# Patient Record
Sex: FEMALE | Race: WHITE | Employment: OTHER | URBAN - METROPOLITAN AREA
[De-identification: names, ages, dates, MRNs, and addresses within clinical notes are randomized per-mention and may not be internally consistent; named-entity substitution may affect disease eponyms.]

---

## 2017-03-11 PROBLEM — R60.0 EDEMA OF LEFT LOWER EXTREMITY: Status: ACTIVE | Noted: 2017-03-11

## 2017-03-25 ENCOUNTER — HOSPITAL ENCOUNTER (OUTPATIENT)
Dept: OTHER | Age: 82
Discharge: HOME | End: 2017-03-25
Attending: FAMILY MEDICINE | Admitting: FAMILY MEDICINE

## 2017-03-25 DIAGNOSIS — R52 PAIN: ICD-10-CM

## 2017-10-13 ENCOUNTER — OFFICE VISIT (OUTPATIENT)
Dept: FAMILY MEDICINE CLINIC | Age: 82
End: 2017-10-13

## 2017-10-13 VITALS
DIASTOLIC BLOOD PRESSURE: 80 MMHG | OXYGEN SATURATION: 99 % | WEIGHT: 103 LBS | BODY MASS INDEX: 20.8 KG/M2 | HEART RATE: 68 BPM | SYSTOLIC BLOOD PRESSURE: 140 MMHG

## 2017-10-13 DIAGNOSIS — L70.0 BLACK HEAD: Primary | ICD-10-CM

## 2017-10-13 PROCEDURE — 1123F ACP DISCUSS/DSCN MKR DOCD: CPT | Performed by: FAMILY MEDICINE

## 2017-10-13 PROCEDURE — 1036F TOBACCO NON-USER: CPT | Performed by: FAMILY MEDICINE

## 2017-10-13 PROCEDURE — 99213 OFFICE O/P EST LOW 20 MIN: CPT | Performed by: FAMILY MEDICINE

## 2017-10-13 PROCEDURE — G8427 DOCREV CUR MEDS BY ELIG CLIN: HCPCS | Performed by: FAMILY MEDICINE

## 2017-10-13 PROCEDURE — 4040F PNEUMOC VAC/ADMIN/RCVD: CPT | Performed by: FAMILY MEDICINE

## 2017-10-13 PROCEDURE — 1090F PRES/ABSN URINE INCON ASSESS: CPT | Performed by: FAMILY MEDICINE

## 2017-10-13 PROCEDURE — G8484 FLU IMMUNIZE NO ADMIN: HCPCS | Performed by: FAMILY MEDICINE

## 2017-10-13 PROCEDURE — G8420 CALC BMI NORM PARAMETERS: HCPCS | Performed by: FAMILY MEDICINE

## 2017-10-13 ASSESSMENT — ENCOUNTER SYMPTOMS
EYE DISCHARGE: 0
EYE REDNESS: 0
EYE PAIN: 0
EYE ITCHING: 0

## 2017-10-13 NOTE — PROGRESS NOTES
Layla Fowler  : 1928  Encounter date: 10/13/2017    This is a 80 y.o. female who presents with  Chief Complaint   Patient presents with    Eyelid Problem     Has a bump on right eye lid x 1 week. Not painful. History of present illness:    Not painful, not sure that it has changed in size. Just noticed since last visit      Allergies   Allergen Reactions    Actonel [Risedronate Sodium] Other (See Comments)     Gastrointestinal upset.  Alendronate Sodium Other (See Comments)     Gastrointestinal upset    Altace [Ramipril]     Boniva [Ibandronic Acid]     Centrax [Prazepam]     Climara [Estradiol]     Doxycycline Calcium [Doxycycline Calcium]     Estrogenic Substance     Metoprolol Other (See Comments)     Severe se from generic.  Mobic [Meloxicam] Other (See Comments)     Fluid.  Pcn [Penicillins]     Sulfa Antibiotics     Triamterene-Hctz     Vioxx [Rofecoxib] Other (See Comments)     Dermatitis.  Dyazide [Hydrochlorothiazide W-Triamterene] Rash     Outpatient Prescriptions Marked as Taking for the 10/13/17 encounter (Office Visit) with Tk Millan MD   Medication Sig Dispense Refill    allopurinol (ZYLOPRIM) 100 MG tablet TAKE ONE TABLET BY MOUTH TWICE DAILY 180 tablet 3    TOPROL XL 25 MG extended release tablet TAKE 1 TABLET DAILY 90 tablet 3    DIOVAN -25 MG per tablet TAKE 1 TABLET DAILY 90 tablet 3    omeprazole (PRILOSEC) 20 MG delayed release capsule Take 1 capsule by mouth Daily 90 capsule 3    cloNIDine (CATAPRES) 0.2 MG tablet Take 1 tablet by mouth 2 times daily 180 tablet 3    Dermatological Products, Misc. [facility]) EMUL Apply externally once daily 1 Tube 4    sucralfate (CARAFATE) 1 GM tablet Take 1 tablet by mouth 4 times daily. 120 tablet 1       Review of Systems   Eyes: Negative for pain, discharge, redness and itching.        Objective:    BP (!) 140/80   Pulse 68   Wt 103 lb (46.7 kg)   SpO2 99%   BMI 20.80 kg/m²   Weight:

## 2017-11-03 ENCOUNTER — NURSE ONLY (OUTPATIENT)
Dept: FAMILY MEDICINE CLINIC | Age: 82
End: 2017-11-03

## 2017-11-03 VITALS — HEART RATE: 76 BPM | DIASTOLIC BLOOD PRESSURE: 62 MMHG | SYSTOLIC BLOOD PRESSURE: 119 MMHG

## 2018-01-05 ENCOUNTER — TELEPHONE (OUTPATIENT)
Dept: FAMILY MEDICINE CLINIC | Age: 83
End: 2018-01-05

## 2018-01-05 RX ORDER — EMOLLIENT COMBINATION NO.32
EMULSION, EXTENDED RELEASE TOPICAL
Qty: 1 TUBE | Refills: 4 | Status: SHIPPED | OUTPATIENT
Start: 2018-01-05

## 2018-01-05 NOTE — TELEPHONE ENCOUNTER
Dermatological Products, [facility]) EMUL  Dispense Quantity:  1 Tube  Print as this is to be sent to [facility]) /

## 2018-01-18 ENCOUNTER — TELEPHONE (OUTPATIENT)
Dept: FAMILY MEDICINE CLINIC | Age: 83
End: 2018-01-18

## 2018-01-24 ENCOUNTER — TELEPHONE (OUTPATIENT)
Dept: FAMILY MEDICINE CLINIC | Age: 83
End: 2018-01-24

## 2018-02-20 ENCOUNTER — HOSPITAL ENCOUNTER (OUTPATIENT)
Dept: OTHER | Age: 83
Discharge: HOME | End: 2018-02-20
Attending: FAMILY MEDICINE | Admitting: FAMILY MEDICINE

## 2018-02-20 ENCOUNTER — OFFICE VISIT (OUTPATIENT)
Dept: FAMILY MEDICINE CLINIC | Age: 83
End: 2018-02-20

## 2018-02-20 VITALS
OXYGEN SATURATION: 98 % | BODY MASS INDEX: 20.32 KG/M2 | SYSTOLIC BLOOD PRESSURE: 172 MMHG | DIASTOLIC BLOOD PRESSURE: 80 MMHG | WEIGHT: 100.6 LBS | HEART RATE: 72 BPM

## 2018-02-20 DIAGNOSIS — M54.5 LOW BACK PAIN, UNSPECIFIED BACK PAIN LATERALITY, UNSPECIFIED CHRONICITY, WITH SCIATICA PRESENCE UNSPECIFIED: ICD-10-CM

## 2018-02-20 DIAGNOSIS — M25.551 RIGHT HIP PAIN: ICD-10-CM

## 2018-02-20 DIAGNOSIS — I10 ESSENTIAL HYPERTENSION: ICD-10-CM

## 2018-02-20 DIAGNOSIS — M53.3 SACROILIAC JOINT DYSFUNCTION OF RIGHT SIDE: ICD-10-CM

## 2018-02-20 DIAGNOSIS — M25.551 PAIN OF RIGHT HIP JOINT: Primary | ICD-10-CM

## 2018-02-20 PROCEDURE — 4040F PNEUMOC VAC/ADMIN/RCVD: CPT | Performed by: FAMILY MEDICINE

## 2018-02-20 PROCEDURE — 99214 OFFICE O/P EST MOD 30 MIN: CPT | Performed by: FAMILY MEDICINE

## 2018-02-20 PROCEDURE — G8427 DOCREV CUR MEDS BY ELIG CLIN: HCPCS | Performed by: FAMILY MEDICINE

## 2018-02-20 PROCEDURE — 1090F PRES/ABSN URINE INCON ASSESS: CPT | Performed by: FAMILY MEDICINE

## 2018-02-20 PROCEDURE — 1036F TOBACCO NON-USER: CPT | Performed by: FAMILY MEDICINE

## 2018-02-20 PROCEDURE — 1123F ACP DISCUSS/DSCN MKR DOCD: CPT | Performed by: FAMILY MEDICINE

## 2018-02-20 PROCEDURE — G8420 CALC BMI NORM PARAMETERS: HCPCS | Performed by: FAMILY MEDICINE

## 2018-02-20 PROCEDURE — G8484 FLU IMMUNIZE NO ADMIN: HCPCS | Performed by: FAMILY MEDICINE

## 2018-02-20 NOTE — PATIENT INSTRUCTIONS
more pain medicines at the same time unless the doctor told you to. Many pain medicines have acetaminophen, which is Tylenol. Too much acetaminophen (Tylenol) can be harmful. · To prevent future back pain, do exercises to stretch and strengthen your back and stomach. Learn how to use good posture, safe lifting techniques, and proper body mechanics. When should you call for help? Call 911 anytime you think you may need emergency care. For example, call if:  ? · You are unable to move a leg at all. ?Call your doctor now or seek immediate medical care if:  ? · You have new or worse symptoms in your legs or buttocks. Symptoms may include:  ¨ Numbness or tingling. ¨ Weakness. ¨ Pain. ? · You lose bladder or bowel control. ? Watch closely for changes in your health, and be sure to contact your doctor if:  ? · You are not getting better as expected. Where can you learn more? Go to [email or web address]://[facility].[employer]. org and sign in to your [employer] account. Enter W719 in the [employer] box to learn more about \"Sacroiliac Joint Pain: Care Instructions. \"     If you do not have an account, please click on the \"Sign Up Now\" link. Current as of: March 21, 2017  Content Version: 11.5  © 4225-0053 [employer], Inc[employer]. Care instructions adapted under license by [facility] ([facility]). If you have questions about a medical condition or this instruction, always ask your healthcare professional. [place] [address] any warranty or liability for your use of this information.

## 2018-03-26 ENCOUNTER — TELEPHONE (OUTPATIENT)
Dept: FAMILY MEDICINE CLINIC | Age: 83
End: 2018-03-26

## 2018-04-17 ENCOUNTER — OFFICE VISIT (OUTPATIENT)
Dept: FAMILY MEDICINE CLINIC | Age: 83
End: 2018-04-17

## 2018-04-17 VITALS
DIASTOLIC BLOOD PRESSURE: 76 MMHG | HEART RATE: 58 BPM | BODY MASS INDEX: 20.32 KG/M2 | SYSTOLIC BLOOD PRESSURE: 138 MMHG | OXYGEN SATURATION: 98 % | WEIGHT: 100.6 LBS

## 2018-04-17 DIAGNOSIS — M81.0 AGE-RELATED OSTEOPOROSIS WITHOUT CURRENT PATHOLOGICAL FRACTURE: ICD-10-CM

## 2018-04-17 DIAGNOSIS — M10.9 GOUT OF WRIST, UNSPECIFIED CAUSE, UNSPECIFIED CHRONICITY, UNSPECIFIED LATERALITY: ICD-10-CM

## 2018-04-17 DIAGNOSIS — I10 ESSENTIAL HYPERTENSION: Primary | ICD-10-CM

## 2018-04-17 DIAGNOSIS — R60.0 EDEMA OF LEFT LOWER EXTREMITY: ICD-10-CM

## 2018-04-17 DIAGNOSIS — M19.90 OSTEOARTHRITIS, UNSPECIFIED OSTEOARTHRITIS TYPE, UNSPECIFIED SITE: ICD-10-CM

## 2018-04-17 DIAGNOSIS — R41.3 MEMORY LOSS: ICD-10-CM

## 2018-04-17 PROBLEM — M85.80 OSTEOPENIA: Status: RESOLVED | Noted: 2018-04-17 | Resolved: 2018-04-17

## 2018-04-17 PROBLEM — M85.80 OSTEOPENIA: Status: ACTIVE | Noted: 2018-04-17

## 2018-04-17 PROCEDURE — 99214 OFFICE O/P EST MOD 30 MIN: CPT | Performed by: FAMILY MEDICINE

## 2018-04-17 PROCEDURE — G8420 CALC BMI NORM PARAMETERS: HCPCS | Performed by: FAMILY MEDICINE

## 2018-04-17 PROCEDURE — 1036F TOBACCO NON-USER: CPT | Performed by: FAMILY MEDICINE

## 2018-04-17 PROCEDURE — 1090F PRES/ABSN URINE INCON ASSESS: CPT | Performed by: FAMILY MEDICINE

## 2018-04-17 PROCEDURE — 1123F ACP DISCUSS/DSCN MKR DOCD: CPT | Performed by: FAMILY MEDICINE

## 2018-04-17 PROCEDURE — 4040F PNEUMOC VAC/ADMIN/RCVD: CPT | Performed by: FAMILY MEDICINE

## 2018-04-17 PROCEDURE — G8427 DOCREV CUR MEDS BY ELIG CLIN: HCPCS | Performed by: FAMILY MEDICINE

## 2018-04-17 ASSESSMENT — PATIENT HEALTH QUESTIONNAIRE - PHQ9
1. LITTLE INTEREST OR PLEASURE IN DOING THINGS: 0
SUM OF ALL RESPONSES TO PHQ9 QUESTIONS 1 & 2: 0
2. FEELING DOWN, DEPRESSED OR HOPELESS: 0
SUM OF ALL RESPONSES TO PHQ QUESTIONS 1-9: 0

## 2018-09-18 ENCOUNTER — TELEPHONE (OUTPATIENT)
Dept: FAMILY MEDICINE CLINIC | Age: 83
End: 2018-09-18

## 2018-09-18 DIAGNOSIS — R41.3 MEMORY LOSS: ICD-10-CM

## 2018-09-18 DIAGNOSIS — I10 ESSENTIAL HYPERTENSION: Primary | ICD-10-CM

## 2018-09-18 DIAGNOSIS — R41.0 CONFUSION: ICD-10-CM

## 2018-09-19 NOTE — TELEPHONE ENCOUNTER
Advised Raisa of information and lab orders put in Epic for her to go to [id number] [facility].

## 2018-09-19 NOTE — TELEPHONE ENCOUNTER
First I would recommend that she have laboratory profiling makers nothing wrong including a CBC, CMP and urinalysis.   Secondly they can use melatonin 5 mg daily at bedtime

## 2018-10-01 ENCOUNTER — HOSPITAL ENCOUNTER (OUTPATIENT)
Age: 83
Discharge: HOME | End: 2018-10-01
Payer: MEDICARE

## 2018-10-01 DIAGNOSIS — R41.0 CONFUSION: ICD-10-CM

## 2018-10-01 DIAGNOSIS — I10 ESSENTIAL HYPERTENSION: ICD-10-CM

## 2018-10-01 DIAGNOSIS — R41.3 MEMORY LOSS: ICD-10-CM

## 2018-10-01 LAB
A/G RATIO: 1.3 (ref 1.1–2.2)
ALBUMIN SERPL-MCNC: 4.4 G/DL (ref 3.4–5)
ALP BLD-CCNC: 58 U/L (ref 40–129)
ALT SERPL-CCNC: 12 U/L (ref 10–40)
ANION GAP SERPL CALCULATED.3IONS-SCNC: 13 MMOL/L (ref 3–16)
AST SERPL-CCNC: 22 U/L (ref 15–37)
BILIRUB SERPL-MCNC: 0.4 MG/DL (ref 0–1)
BILIRUBIN URINE: NEGATIVE
BLOOD, URINE: NEGATIVE
BUN BLDV-MCNC: 40 MG/DL (ref 7–20)
CALCIUM SERPL-MCNC: 9.8 MG/DL (ref 8.3–10.6)
CHLORIDE BLD-SCNC: 100 MMOL/L (ref 99–110)
CLARITY: CLEAR
CO2: 27 MMOL/L (ref 21–32)
COLOR: YELLOW
CREAT SERPL-MCNC: 1.5 MG/DL (ref 0.6–1.2)
GFR AFRICAN AMERICAN: 39
GFR NON-AFRICAN AMERICAN: 33
GLOBULIN: 3.5 G/DL
GLUCOSE BLD-MCNC: 110 MG/DL (ref 70–99)
GLUCOSE URINE: NEGATIVE MG/DL
HCT VFR BLD CALC: 35.4 % (ref 36–48)
HEMOGLOBIN: 11.6 G/DL (ref 12–16)
KETONES, URINE: NEGATIVE MG/DL
LEUKOCYTE ESTERASE, URINE: NEGATIVE
MCH RBC QN AUTO: 28.7 PG (ref 26–34)
MCHC RBC AUTO-ENTMCNC: 32.8 G/DL (ref 31–36)
MCV RBC AUTO: 87.4 FL (ref 80–100)
MICROSCOPIC EXAMINATION: NORMAL
NITRITE, URINE: NEGATIVE
PDW BLD-RTO: 14.8 % (ref 12.4–15.4)
PH UA: 6
PLATELET # BLD: 180 K/UL (ref 135–450)
PMV BLD AUTO: 9.9 FL (ref 5–10.5)
POTASSIUM SERPL-SCNC: 4.1 MMOL/L (ref 3.5–5.1)
PROTEIN UA: NEGATIVE MG/DL
RBC # BLD: 4.05 M/UL (ref 4–5.2)
SODIUM BLD-SCNC: 140 MMOL/L (ref 136–145)
SPECIFIC GRAVITY UA: 1.02
TOTAL PROTEIN: 7.9 G/DL (ref 6.4–8.2)
URINE TYPE: NORMAL
UROBILINOGEN, URINE: 0.2 E.U./DL
WBC # BLD: 7.2 K/UL (ref 4–11)

## 2018-10-01 PROCEDURE — 80053 COMPREHEN METABOLIC PANEL: CPT

## 2018-10-01 PROCEDURE — 36415 COLL VENOUS BLD VENIPUNCTURE: CPT

## 2018-10-01 PROCEDURE — 81003 URINALYSIS AUTO W/O SCOPE: CPT

## 2018-10-01 PROCEDURE — 85027 COMPLETE CBC AUTOMATED: CPT

## 2018-10-02 ENCOUNTER — OFFICE VISIT (OUTPATIENT)
Dept: FAMILY MEDICINE CLINIC | Age: 83
End: 2018-10-02
Payer: MEDICARE

## 2018-10-02 VITALS
DIASTOLIC BLOOD PRESSURE: 62 MMHG | OXYGEN SATURATION: 97 % | BODY MASS INDEX: 19.82 KG/M2 | SYSTOLIC BLOOD PRESSURE: 140 MMHG | WEIGHT: 98.13 LBS | RESPIRATION RATE: 12 BRPM | HEART RATE: 63 BPM

## 2018-10-02 DIAGNOSIS — Z23 NEED FOR INFLUENZA VACCINATION: ICD-10-CM

## 2018-10-02 DIAGNOSIS — R41.3 MEMORY LOSS: ICD-10-CM

## 2018-10-02 DIAGNOSIS — M19.90 OSTEOARTHRITIS, UNSPECIFIED OSTEOARTHRITIS TYPE, UNSPECIFIED SITE: ICD-10-CM

## 2018-10-02 DIAGNOSIS — M10.9 GOUT OF WRIST, UNSPECIFIED CAUSE, UNSPECIFIED CHRONICITY, UNSPECIFIED LATERALITY: ICD-10-CM

## 2018-10-02 DIAGNOSIS — I10 ESSENTIAL HYPERTENSION: Primary | ICD-10-CM

## 2018-10-02 PROCEDURE — 1036F TOBACCO NON-USER: CPT | Performed by: FAMILY MEDICINE

## 2018-10-02 PROCEDURE — G8427 DOCREV CUR MEDS BY ELIG CLIN: HCPCS | Performed by: FAMILY MEDICINE

## 2018-10-02 PROCEDURE — 1101F PT FALLS ASSESS-DOCD LE1/YR: CPT | Performed by: FAMILY MEDICINE

## 2018-10-02 PROCEDURE — 4040F PNEUMOC VAC/ADMIN/RCVD: CPT | Performed by: FAMILY MEDICINE

## 2018-10-02 PROCEDURE — 90662 IIV NO PRSV INCREASED AG IM: CPT | Performed by: FAMILY MEDICINE

## 2018-10-02 PROCEDURE — G8420 CALC BMI NORM PARAMETERS: HCPCS | Performed by: FAMILY MEDICINE

## 2018-10-02 PROCEDURE — G8482 FLU IMMUNIZE ORDER/ADMIN: HCPCS | Performed by: FAMILY MEDICINE

## 2018-10-02 PROCEDURE — 99214 OFFICE O/P EST MOD 30 MIN: CPT | Performed by: FAMILY MEDICINE

## 2018-10-02 PROCEDURE — G0008 ADMIN INFLUENZA VIRUS VAC: HCPCS | Performed by: FAMILY MEDICINE

## 2018-10-02 PROCEDURE — 1123F ACP DISCUSS/DSCN MKR DOCD: CPT | Performed by: FAMILY MEDICINE

## 2018-10-02 PROCEDURE — 1090F PRES/ABSN URINE INCON ASSESS: CPT | Performed by: FAMILY MEDICINE

## 2018-10-02 RX ORDER — MEMANTINE HYDROCHLORIDE 14 MG/1
14 CAPSULE, EXTENDED RELEASE ORAL DAILY
Qty: 30 CAPSULE | Refills: 1 | Status: SHIPPED | OUTPATIENT
Start: 2018-10-02 | End: 2018-11-21

## 2018-10-02 RX ORDER — ALLOPURINOL 100 MG/1
TABLET ORAL
Qty: 180 TABLET | Refills: 3 | Status: SHIPPED | OUTPATIENT
Start: 2018-10-02

## 2018-10-02 RX ORDER — METOPROLOL SUCCINATE 25 MG
TABLET, EXTENDED RELEASE 24 HR ORAL
Qty: 90 TABLET | Refills: 3 | Status: SHIPPED | OUTPATIENT
Start: 2018-10-02

## 2018-10-02 RX ORDER — VALSARTAN/HYDROCHLOROTHIAZIDE 160MG-25MG
TABLET ORAL
Qty: 90 TABLET | Refills: 3 | Status: SHIPPED | OUTPATIENT
Start: 2018-10-02

## 2018-10-02 RX ORDER — CLONIDINE HYDROCHLORIDE 0.2 MG/1
TABLET ORAL
Qty: 180 TABLET | Refills: 3 | Status: SHIPPED | OUTPATIENT
Start: 2018-10-02

## 2018-10-02 NOTE — PROGRESS NOTES
Vaccine Information Sheet, \"Influenza - Inactivated\"  given to Gloria Chirinos, or parent/legal guardian of  Gloria Chirinos and verbalized understanding. Patient responses:    Have you ever had a reaction to a flu vaccine? No  Are you able to eat eggs without adverse effects? Yes  Do you have any current illness? No  Have you ever had Guillian Maricao Syndrome? No    Flu vaccine given per order. Please see immunization tab.
Cognition and memory are impaired. Assessment:      Mendel Bruner was seen today for follow-up. Diagnoses and all orders for this visit:    Essential hypertension    Need for influenza vaccination  -     INFLUENZA, HIGH DOSE, 65 YRS +, IM, PF, PREFILL SYR, 0.5ML (FLUZONE HD)    Memory loss    Osteoarthritis, unspecified osteoarthritis type, unspecified site    Gout of wrist, unspecified cause, unspecified chronicity, unspecified laterality    Other orders  -     cloNIDine (CATAPRES) 0.2 MG tablet; TAKE 1 TABLET TWICE A DAY  -     TOPROL XL 25 MG extended release tablet; TAKE 1 TABLET DAILY  -     allopurinol (ZYLOPRIM) 100 MG tablet; TAKE ONE TABLET BY MOUTH TWICE DAILY  -     DIOVAN -25 MG per tablet; TAKE 1 TABLET DAILY  -     memantine ER (NAMENDA XR) 14 MG CP24 extended release capsule; Take 1 capsule by mouth daily            Plan:      Laboratory profile reviewed with patient and daughter  Clinically her symptoms are more fitting with Lewy body dementia and will start patient on Namenda for hallucinations. Maintain current medications otherwise  Daughter to call back with an update in regards to her hallucinations with the next month to decide if the Namenda medication should be increased to higher dose. We did discuss increasing caloric intake such as eating more ice cream or snacks  RTC 6 months    Please note that this chart was generated using Dragon dictation software. Although every effort was made to ensure the accuracy of this automated transcription, some errors in transcription may have occurred.

## 2018-10-02 NOTE — PATIENT INSTRUCTIONS
Patient Education        Influenza (Flu) Vaccine: Care Instructions  Your Care Instructions    Influenza (flu) is an infection in the lungs and breathing passages. It is caused by the influenza virus. There are different strains, or types, of the flu virus every year. The flu comes on quickly. It can cause a cough, stuffy nose, fever, chills, tiredness, and aches and pains. These symptoms may last up to 10 days. The flu can make you feel very sick, but most of the time it doesn't lead to other problems. But it can cause serious problems in people who are older or who have a long-term illness, such as heart disease or diabetes. You can help prevent the flu by getting a flu vaccine every year, as soon as it is available. You cannot get the flu from the vaccine. The vaccine prevents most cases of the flu. But even when the vaccine doesn't prevent the flu, it can make symptoms less severe and reduce the chance of problems from the flu. Sometimes, young children and people who have an immune system problem may have a slight fever or muscle aches or pains 6 to 12 hours after getting the shot. These symptoms usually last 1 or 2 days. Follow-up care is a key part of your treatment and safety. Be sure to make and go to all appointments, and call your doctor if you are having problems. It's also a good idea to know your test results and keep a list of the medicines you take. Who should get the flu vaccine? Everyone age 7 months or older should get a flu vaccine each year. It lowers the chance of getting and spreading the flu. The vaccine is very important for people who are at high risk for getting other health problems from the flu. This includes:  · Anyone 48years of age or older. · People who live in a long-term care center, such as a nursing home. · All children 6 months through 25years of age. · Adults and children 6 months and older who have long-term heart or lung problems, such as asthma.   · Adults and children 6 months and older who needed medical care or were in a hospital during the past year because of diabetes, chronic kidney disease, or a weak immune system (including HIV or AIDS). · Women who will be pregnant during the flu season. · People who have any condition that can make it hard to breathe or swallow (such as a brain injury or muscle disorders). · People who can give the flu to others who are at high risk for problems from the flu. This includes all health care workers and close contacts of people age 72 or older. Who should not get the flu vaccine? The person who gives the vaccine may tell you not to get it if you:  · Have a severe allergy to eggs or any part of the vaccine. · Have had a severe reaction to a flu vaccine in the past.  · Have had Guillain-Barré syndrome (GBS). · Are sick with a fever. (Get the vaccine when symptoms are gone.)  How can you care for yourself at home? · If you or your child has a sore arm or a slight fever after the shot, take an over-the-counter pain medicine, such as acetaminophen (Tylenol) or ibuprofen (Advil, Motrin). Read and follow all instructions on the label. Do not give aspirin to anyone younger than 20. It has been linked to Reye syndrome, a serious illness. · Do not take two or more pain medicines at the same time unless the doctor told you to. Many pain medicines have acetaminophen, which is Tylenol. Too much acetaminophen (Tylenol) can be harmful. When should you call for help? Call 911 anytime you think you may need emergency care. For example, call if after getting the flu vaccine:    · You have symptoms of a severe reaction to the flu vaccine. Symptoms of a severe reaction may include:  ¨ Severe difficulty breathing. ¨ Sudden raised, red areas (hives) all over your body.   ¨ Severe lightheadedness.    Call your doctor now or seek immediate medical care if after getting the flu vaccine:    · You think you are having a reaction to the flu

## 2018-11-13 ENCOUNTER — TELEPHONE (OUTPATIENT)
Dept: FAMILY MEDICINE CLINIC | Age: 83
End: 2018-11-13

## 2018-11-13 NOTE — TELEPHONE ENCOUNTER
1.   Patients daughter is stating that the medication (memantine ER (NAMENDA XR) 14 MG CP24 extended release capsule [[id number]]   memantine ER (NAMENDA XR) 14 MG CP24 extended release capsule    ) is making her mother angry so she stopped her taking this medication a week ago. She was on it for 2 weeks and she wasn't approving seem like she was getting worse. Daughter is wanting to know if there is something else to help her mom? 2. Also she has been giving her melentonin 3mg and it is wiping the patient out sleepin for a few days so what would be a reasonable mg to give the patient ? 3. Patients daughter is needing a home health order to get help in her home to take care of her mother. Mother seems more delusional she is caring on conversations with people that are not even in the room. Daughter states its like she is reading a book and playing all the parts. Daughter would like more information about how to decide if her mother needs to be in a nursing home?       Please advise

## 2018-11-20 ENCOUNTER — TELEPHONE (OUTPATIENT)
Dept: FAMILY MEDICINE CLINIC | Age: 83
End: 2018-11-20

## 2018-11-21 RX ORDER — QUETIAPINE FUMARATE 25 MG/1
25 TABLET, FILM COATED ORAL NIGHTLY
Qty: 30 TABLET | Refills: 1 | Status: SHIPPED | OUTPATIENT
Start: 2018-11-21 | End: 2019-01-22 | Stop reason: SDUPTHER

## 2018-12-07 ENCOUNTER — TELEPHONE (OUTPATIENT)
Dept: FAMILY MEDICINE CLINIC | Age: 83
End: 2018-12-07

## 2018-12-07 NOTE — TELEPHONE ENCOUNTER
Not sure if she was ever evaluated by geriatric specialist?? Would not increase seroquel until patient is evaluated.   Patient can take Aleve

## 2018-12-07 NOTE — TELEPHONE ENCOUNTER
Patients daughter is stating that her mom is on (QUEtiapine (SEROQUEL) 25 MG tablet    ) she states that it is helping. But the patient is having delusions and up through the night. Daughter wants to know if maybe that medication be increased or can she have something called in to help her sleep at night?       Pharmacy:  [facility] [address], [address] [phone]      Please advice

## 2018-12-07 NOTE — TELEPHONE ENCOUNTER
Gave [facility] daughter, [facility], and Dr. Sabrina mujica of [facility]

## 2019-01-21 DIAGNOSIS — R41.3 MEMORY LOSS: Primary | ICD-10-CM

## 2019-01-22 RX ORDER — QUETIAPINE FUMARATE 25 MG/1
25 TABLET, FILM COATED ORAL NIGHTLY
Qty: 30 TABLET | Refills: 2 | Status: SHIPPED | OUTPATIENT
Start: 2019-01-22

## 2019-01-23 ENCOUNTER — TELEPHONE (OUTPATIENT)
Dept: FAMILY MEDICINE CLINIC | Age: 84
End: 2019-01-23

## 2019-01-25 ENCOUNTER — TELEPHONE (OUTPATIENT)
Dept: FAMILY MEDICINE CLINIC | Age: 84
End: 2019-01-25

## 2019-01-28 ENCOUNTER — OFFICE VISIT (OUTPATIENT)
Dept: NEUROLOGY | Age: 84
End: 2019-01-28
Payer: MEDICARE

## 2019-01-28 ENCOUNTER — TELEPHONE (OUTPATIENT)
Dept: FAMILY MEDICINE CLINIC | Age: 84
End: 2019-01-28

## 2019-01-28 VITALS
SYSTOLIC BLOOD PRESSURE: 135 MMHG | DIASTOLIC BLOOD PRESSURE: 63 MMHG | BODY MASS INDEX: 20.2 KG/M2 | WEIGHT: 100 LBS | HEART RATE: 66 BPM

## 2019-01-28 DIAGNOSIS — F02.818 LATE ONSET ALZHEIMER'S DISEASE WITH BEHAVIORAL DISTURBANCE (HCC): Primary | ICD-10-CM

## 2019-01-28 DIAGNOSIS — G30.1 LATE ONSET ALZHEIMER'S DISEASE WITH BEHAVIORAL DISTURBANCE (HCC): Primary | ICD-10-CM

## 2019-01-28 PROCEDURE — 1090F PRES/ABSN URINE INCON ASSESS: CPT | Performed by: PSYCHIATRY & NEUROLOGY

## 2019-01-28 PROCEDURE — 1101F PT FALLS ASSESS-DOCD LE1/YR: CPT | Performed by: PSYCHIATRY & NEUROLOGY

## 2019-01-28 PROCEDURE — G8420 CALC BMI NORM PARAMETERS: HCPCS | Performed by: PSYCHIATRY & NEUROLOGY

## 2019-01-28 PROCEDURE — G8482 FLU IMMUNIZE ORDER/ADMIN: HCPCS | Performed by: PSYCHIATRY & NEUROLOGY

## 2019-01-28 PROCEDURE — 1036F TOBACCO NON-USER: CPT | Performed by: PSYCHIATRY & NEUROLOGY

## 2019-01-28 PROCEDURE — 4040F PNEUMOC VAC/ADMIN/RCVD: CPT | Performed by: PSYCHIATRY & NEUROLOGY

## 2019-01-28 PROCEDURE — 99204 OFFICE O/P NEW MOD 45 MIN: CPT | Performed by: PSYCHIATRY & NEUROLOGY

## 2019-01-28 PROCEDURE — G8427 DOCREV CUR MEDS BY ELIG CLIN: HCPCS | Performed by: PSYCHIATRY & NEUROLOGY

## 2019-01-28 PROCEDURE — 1123F ACP DISCUSS/DSCN MKR DOCD: CPT | Performed by: PSYCHIATRY & NEUROLOGY

## 2019-01-28 RX ORDER — DONEPEZIL HYDROCHLORIDE 5 MG/1
5 TABLET, FILM COATED ORAL NIGHTLY
COMMUNITY

## 2019-01-28 RX ORDER — LANOLIN ALCOHOL/MO/W.PET/CERES
3 CREAM (GRAM) TOPICAL NIGHTLY PRN
COMMUNITY

## 2019-02-12 RX ORDER — SUCRALFATE 1 G/1
TABLET ORAL
Qty: 120 TABLET | Refills: 1 | Status: SHIPPED | OUTPATIENT
Start: 2019-02-12

## 2022-01-24 ENCOUNTER — HOSPITAL ENCOUNTER (INPATIENT)
Age: 87
LOS: 3 days | Discharge: HOSPICE | DRG: 956 | End: 2022-01-27
Attending: EMERGENCY MEDICINE | Admitting: INTERNAL MEDICINE
Payer: MEDICARE

## 2022-01-24 ENCOUNTER — APPOINTMENT (OUTPATIENT)
Dept: GENERAL RADIOLOGY | Age: 87
DRG: 956 | End: 2022-01-24
Payer: MEDICARE

## 2022-01-24 ENCOUNTER — APPOINTMENT (OUTPATIENT)
Dept: CT IMAGING | Age: 87
DRG: 956 | End: 2022-01-24
Payer: MEDICARE

## 2022-01-24 DIAGNOSIS — R93.0 ABNORMAL CT OF THE HEAD: ICD-10-CM

## 2022-01-24 DIAGNOSIS — N30.01 ACUTE CYSTITIS WITH HEMATURIA: ICD-10-CM

## 2022-01-24 DIAGNOSIS — S72.002A CLOSED FRACTURE OF LEFT HIP, INITIAL ENCOUNTER (HCC): Primary | ICD-10-CM

## 2022-01-24 DIAGNOSIS — F03.91 DEMENTIA WITH BEHAVIORAL DISTURBANCE, UNSPECIFIED DEMENTIA TYPE: ICD-10-CM

## 2022-01-24 PROBLEM — D64.9 ANEMIA: Status: ACTIVE | Noted: 2022-01-24

## 2022-01-24 PROBLEM — N39.0 UTI (URINARY TRACT INFECTION): Status: ACTIVE | Noted: 2022-01-24

## 2022-01-24 LAB
A/G RATIO: 1.2 (ref 1.1–2.2)
ALBUMIN SERPL-MCNC: 4.2 G/DL (ref 3.4–5)
ALP BLD-CCNC: 74 U/L (ref 40–129)
ALT SERPL-CCNC: 16 U/L (ref 10–40)
ANION GAP SERPL CALCULATED.3IONS-SCNC: 11 MMOL/L (ref 3–16)
APTT: 28.8 SEC (ref 26.2–38.6)
AST SERPL-CCNC: 26 U/L (ref 15–37)
BANDED NEUTROPHILS RELATIVE PERCENT: 2 % (ref 0–7)
BASOPHILS ABSOLUTE: 0 K/UL (ref 0–0.2)
BASOPHILS RELATIVE PERCENT: 0 %
BILIRUB SERPL-MCNC: <0.2 MG/DL (ref 0–1)
BILIRUBIN URINE: NEGATIVE
BLOOD, URINE: ABNORMAL
BUN BLDV-MCNC: 33 MG/DL (ref 7–20)
CALCIUM SERPL-MCNC: 9.8 MG/DL (ref 8.3–10.6)
CHLORIDE BLD-SCNC: 101 MMOL/L (ref 99–110)
CLARITY: ABNORMAL
CO2: 27 MMOL/L (ref 21–32)
COLOR: YELLOW
CREAT SERPL-MCNC: 1.3 MG/DL (ref 0.6–1.2)
EOSINOPHILS ABSOLUTE: 0 K/UL (ref 0–0.6)
EOSINOPHILS RELATIVE PERCENT: 0 %
EPITHELIAL CELLS, UA: 2 /HPF (ref 0–5)
GFR AFRICAN AMERICAN: 46
GFR NON-AFRICAN AMERICAN: 38
GLUCOSE BLD-MCNC: 151 MG/DL (ref 70–99)
GLUCOSE URINE: NEGATIVE MG/DL
HCT VFR BLD CALC: 35.8 % (ref 36–48)
HEMOGLOBIN: 11.6 G/DL (ref 12–16)
HYALINE CASTS: 3 /LPF (ref 0–8)
INR BLD: 0.97 (ref 0.88–1.12)
KETONES, URINE: NEGATIVE MG/DL
LEUKOCYTE ESTERASE, URINE: ABNORMAL
LYMPHOCYTES ABSOLUTE: 0.9 K/UL (ref 1–5.1)
LYMPHOCYTES RELATIVE PERCENT: 6 %
MCH RBC QN AUTO: 28.2 PG (ref 26–34)
MCHC RBC AUTO-ENTMCNC: 32.4 G/DL (ref 31–36)
MCV RBC AUTO: 87.1 FL (ref 80–100)
MICROSCOPIC EXAMINATION: YES
MONOCYTES ABSOLUTE: 0.3 K/UL (ref 0–1.3)
MONOCYTES RELATIVE PERCENT: 2 %
NEUTROPHILS ABSOLUTE: 13.6 K/UL (ref 1.7–7.7)
NEUTROPHILS RELATIVE PERCENT: 90 %
NITRITE, URINE: NEGATIVE
PDW BLD-RTO: 14.2 % (ref 12.4–15.4)
PH UA: 7 (ref 5–8)
PLATELET # BLD: 228 K/UL (ref 135–450)
PLATELET SLIDE REVIEW: ADEQUATE
PMV BLD AUTO: 8.8 FL (ref 5–10.5)
POTASSIUM REFLEX MAGNESIUM: 4.8 MMOL/L (ref 3.5–5.1)
PRO-BNP: 443 PG/ML (ref 0–449)
PROTEIN UA: 30 MG/DL
PROTHROMBIN TIME: 11 SEC (ref 9.9–12.7)
RBC # BLD: 4.12 M/UL (ref 4–5.2)
RBC # BLD: NORMAL 10*6/UL
RBC UA: 391 /HPF (ref 0–4)
SARS-COV-2, NAAT: NOT DETECTED
SLIDE REVIEW: ABNORMAL
SODIUM BLD-SCNC: 139 MMOL/L (ref 136–145)
SPECIFIC GRAVITY UA: 1.02 (ref 1–1.03)
TOTAL PROTEIN: 7.8 G/DL (ref 6.4–8.2)
TROPONIN: <0.01 NG/ML
URINE REFLEX TO CULTURE: YES
URINE TYPE: ABNORMAL
UROBILINOGEN, URINE: 1 E.U./DL
WBC # BLD: 14.8 K/UL (ref 4–11)
WBC UA: 12 /HPF (ref 0–5)

## 2022-01-24 PROCEDURE — 81001 URINALYSIS AUTO W/SCOPE: CPT

## 2022-01-24 PROCEDURE — 96361 HYDRATE IV INFUSION ADD-ON: CPT

## 2022-01-24 PROCEDURE — 71045 X-RAY EXAM CHEST 1 VIEW: CPT

## 2022-01-24 PROCEDURE — 51702 INSERT TEMP BLADDER CATH: CPT

## 2022-01-24 PROCEDURE — 6370000000 HC RX 637 (ALT 250 FOR IP): Performed by: PHYSICIAN ASSISTANT

## 2022-01-24 PROCEDURE — 85610 PROTHROMBIN TIME: CPT

## 2022-01-24 PROCEDURE — 72125 CT NECK SPINE W/O DYE: CPT

## 2022-01-24 PROCEDURE — 99285 EMERGENCY DEPT VISIT HI MDM: CPT

## 2022-01-24 PROCEDURE — 93005 ELECTROCARDIOGRAM TRACING: CPT | Performed by: PHYSICIAN ASSISTANT

## 2022-01-24 PROCEDURE — 73502 X-RAY EXAM HIP UNI 2-3 VIEWS: CPT

## 2022-01-24 PROCEDURE — 84484 ASSAY OF TROPONIN QUANT: CPT

## 2022-01-24 PROCEDURE — 96375 TX/PRO/DX INJ NEW DRUG ADDON: CPT

## 2022-01-24 PROCEDURE — 1200000000 HC SEMI PRIVATE

## 2022-01-24 PROCEDURE — 2580000003 HC RX 258: Performed by: PHYSICIAN ASSISTANT

## 2022-01-24 PROCEDURE — 83880 ASSAY OF NATRIURETIC PEPTIDE: CPT

## 2022-01-24 PROCEDURE — 80053 COMPREHEN METABOLIC PANEL: CPT

## 2022-01-24 PROCEDURE — 6360000002 HC RX W HCPCS: Performed by: EMERGENCY MEDICINE

## 2022-01-24 PROCEDURE — 85025 COMPLETE CBC W/AUTO DIFF WBC: CPT

## 2022-01-24 PROCEDURE — 87635 SARS-COV-2 COVID-19 AMP PRB: CPT

## 2022-01-24 PROCEDURE — 6360000002 HC RX W HCPCS: Performed by: PHYSICIAN ASSISTANT

## 2022-01-24 PROCEDURE — 73700 CT LOWER EXTREMITY W/O DYE: CPT

## 2022-01-24 PROCEDURE — 96374 THER/PROPH/DIAG INJ IV PUSH: CPT

## 2022-01-24 PROCEDURE — 36415 COLL VENOUS BLD VENIPUNCTURE: CPT

## 2022-01-24 PROCEDURE — 87086 URINE CULTURE/COLONY COUNT: CPT

## 2022-01-24 PROCEDURE — 70450 CT HEAD/BRAIN W/O DYE: CPT

## 2022-01-24 PROCEDURE — 85730 THROMBOPLASTIN TIME PARTIAL: CPT

## 2022-01-24 RX ORDER — SODIUM CHLORIDE 9 MG/ML
1000 INJECTION, SOLUTION INTRAVENOUS CONTINUOUS
Status: DISCONTINUED | OUTPATIENT
Start: 2022-01-24 | End: 2022-01-25

## 2022-01-24 RX ORDER — MORPHINE SULFATE 4 MG/ML
4 INJECTION, SOLUTION INTRAMUSCULAR; INTRAVENOUS ONCE
Status: COMPLETED | OUTPATIENT
Start: 2022-01-24 | End: 2022-01-24

## 2022-01-24 RX ORDER — LORAZEPAM 2 MG/ML
1 INJECTION INTRAMUSCULAR ONCE
Status: COMPLETED | OUTPATIENT
Start: 2022-01-24 | End: 2022-01-24

## 2022-01-24 RX ORDER — FENTANYL CITRATE 50 UG/ML
50 INJECTION, SOLUTION INTRAMUSCULAR; INTRAVENOUS ONCE
Status: COMPLETED | OUTPATIENT
Start: 2022-01-24 | End: 2022-01-24

## 2022-01-24 RX ORDER — LORAZEPAM 1 MG/1
1 TABLET ORAL ONCE
Status: COMPLETED | OUTPATIENT
Start: 2022-01-24 | End: 2022-01-24

## 2022-01-24 RX ADMIN — LORAZEPAM 1 MG: 1 TABLET ORAL at 18:46

## 2022-01-24 RX ADMIN — MORPHINE SULFATE 4 MG: 4 INJECTION INTRAVENOUS at 19:35

## 2022-01-24 RX ADMIN — SODIUM CHLORIDE 1000 ML: 9 INJECTION, SOLUTION INTRAVENOUS at 19:38

## 2022-01-24 RX ADMIN — Medication 1000 MG: at 22:13

## 2022-01-24 RX ADMIN — LORAZEPAM 1 MG: 2 INJECTION INTRAMUSCULAR at 21:51

## 2022-01-24 RX ADMIN — FENTANYL CITRATE 50 MCG: 0.05 INJECTION, SOLUTION INTRAMUSCULAR; INTRAVENOUS at 21:50

## 2022-01-24 ASSESSMENT — PAIN SCALES - GENERAL
PAINLEVEL_OUTOF10: 8
PAINLEVEL_OUTOF10: 0
PAINLEVEL_OUTOF10: 10
PAINLEVEL_OUTOF10: 10

## 2022-01-24 ASSESSMENT — PAIN DESCRIPTION - LOCATION
LOCATION: HIP
LOCATION: HIP

## 2022-01-24 ASSESSMENT — PAIN DESCRIPTION - PAIN TYPE
TYPE: ACUTE PAIN
TYPE: ACUTE PAIN

## 2022-01-24 NOTE — ED PROVIDER NOTES
[address]        Pt Name: Jose Daniel Cano  MRN: [id number]  [place] 12/21/1928  Date of evaluation: 1/24/2022  Provider: George Hahn PA-C  PCP: Dino Bridges MD  Note Started: 6:30 PM EST        I have seen and evaluated this patient with my supervising physician Luis Bunch MD.    CHIEF COMPLAINT       Chief Complaint   Patient presents with   Lemuel Nicolee     Came in from [facility] from [facility] from s/p fall with left hip pain. Hx of dementia. HISTORY OF PRESENT ILLNESS   (Location, Timing/Onset, Context/Setting, Quality, Duration, Modifying Factors, Severity, Associated Signs and Symptoms)  Note limiting factors. Chief Complaint: Fall, left hip pain. Jose Daniel Cano is a 80 y.o. female who presents by EMS from [address] [place] [address]. Approximately 5 PM this evening patient apparently fell on left hip with complaint of pain and deformity. Hip appears rotated inward and shortened. She has no other related complaints. Patient with known Alzheimer dementia. Patient with history of GERD, gout, HTN, OA, dementia/memory loss, hypothyroidism, age-related osteoporosis    The patient with history of angioplasty bilateral renal arteries secondary to renal artery stenosis. The daughter does report she has had both Covid vaccinations including the booster. The patient's daughter Ivan Postal) is POA. Nursing Notes were all reviewed and agreed with or any disagreements were addressed in the HPI. REVIEW OF SYSTEMS    (2-9 systems for level 4, 10 or more for level 5)     Review of Systems    Positives and Pertinent negatives as per HPI. Except as noted above in the ROS, all other systems were reviewed and negative.        PAST MEDICAL HISTORY     Past Medical History:   Diagnosis Date    Disorder of bone and cartilage, unspecified     Esophagitis, unspecified     Gout, unspecified     Hx of colonic polyps     Hyperthyroidism  Memory loss     Osteoarthrosis, unspecified whether generalized or localized, unspecified site     Pedal edema     Unspecified essential hypertension          SURGICAL HISTORY     Past Surgical History:   Procedure Laterality Date    ANGIOPLASTY Bilateral     -renal art stenosis,     HIP FRACTURE SURGERY Left 1/25/2022    OPEN REDUCTION INTERNAL FIXATION OF INTERTROCHANTERIC FRACTURE OF LEFT HIP USING TROCHANTERIC FIXATION NAIL performed by Santosh Mayorga MD at [place] [address]       Discharge Medication List as of 1/27/2022 10:34 AM      CONTINUE these medications which have NOT CHANGED    Details   sucralfate (CARAFATE) 1 GM tablet TAKE ONE TABLET BY MOUTH FOUR TIMES A DAY, Disp-120 tablet, R-1Normal      donepezil (ARICEPT) 5 MG tablet Take 5 mg by mouth nightlyHistorical Med      melatonin 3 MG TABS tablet Take 3 mg by mouth nightly as neededHistorical Med      QUEtiapine (SEROQUEL) 25 MG tablet Take 1 tablet by mouth nightly, Disp-30 tablet, R-2Normal      ASPIRIN 81 PO Take by mouthHistorical Med      cloNIDine (CATAPRES) 0.2 MG tablet TAKE 1 TABLET TWICE A DAY, Disp-180 tablet, R-3Normal      TOPROL XL 25 MG extended release tablet TAKE 1 TABLET DAILY, Disp-90 tablet, R-3, DAWNormal      allopurinol (ZYLOPRIM) 100 MG tablet TAKE ONE TABLET BY MOUTH TWICE DAILY, Disp-180 tablet, R-3Normal      DIOVAN -25 MG per tablet TAKE 1 TABLET DAILY, Disp-90 tablet, R-3, DAWNormal      omeprazole (PRILOSEC) 20 MG delayed release capsule TAKE ONE CAPSULE BY MOUTH DAILY, Disp-90 capsule, R-3Normal      Dermatological Products, [facility]. [facility]) EMUL Apply externally once daily, Disp-1 Tube, R-4Print               ALLERGIES     Actonel [risedronate sodium], Alendronate sodium, Climara [estradiol], Doxycycline calcium [doxycycline calcium], Estrogenic substance, Mobic [meloxicam], Pcn [penicillins], Sulfa antibiotics, Dyazide [hydrochlorothiazide w-triamterene], and Vioxx [rofecoxib]    FAMILYHISTORY     History reviewed. No pertinent family history. SOCIAL HISTORY       Social History     Tobacco Use    Smoking status: Never Smoker    Smokeless tobacco: Never Used   Substance Use Topics    Alcohol use: No    Drug use: No       SCREENINGS    Amanda Coma Scale  Eye Opening: To speech  Best Verbal Response: Confused  Best Motor Response: Localizes pain  Amanda Coma Scale Score: 12        PHYSICAL EXAM    (up to 7 for level 4, 8 or more for level 5)     ED Triage Vitals [01/24/22 1801]   BP Temp Temp Source Pulse Resp SpO2 Height Weight   -- 98.2 °F (36.8 °C) Axillary 96 20 93 % 4' 10\" (1.473 m) 110 lb (49.9 kg)       Physical Exam  Vitals and nursing note reviewed. Constitutional:       Appearance: Normal appearance. She is well-developed and normal weight. HENT:      Head: Normocephalic and atraumatic. Right Ear: External ear normal.      Left Ear: External ear normal.   Eyes:      General: No scleral icterus. Right eye: No discharge. Left eye: No discharge. Conjunctiva/sclera: Conjunctivae normal.   Cardiovascular:      Rate and Rhythm: Normal rate and regular rhythm. Heart sounds: Normal heart sounds. Pulmonary:      Effort: Pulmonary effort is normal.      Breath sounds: Normal breath sounds. Abdominal:      General: Abdomen is flat. Bowel sounds are normal.      Palpations: Abdomen is soft. Musculoskeletal:         General: Tenderness present. Cervical back: Normal range of motion and neck supple. Right lower leg: No edema. Left lower leg: No edema. Comments: Noted tenderness left hip with any range of motion. Patient exhibits sensory to touch left foot. DP pulse noted. Skin:     General: Skin is warm and dry. Neurological:      General: No focal deficit present. Mental Status: She is alert and oriented to person, place, and time. Mental status is at baseline.    Psychiatric:         Mood and Affect: Mood normal.         Behavior: Behavior normal.         DIAGNOSTIC RESULTS   LABS:    Labs Reviewed   CBC WITH AUTO DIFFERENTIAL - Abnormal; Notable for the following components:       Result Value    WBC 14.8 (*)     Hemoglobin 11.6 (*)     Hematocrit 35.8 (*)     Neutrophils Absolute 13.6 (*)     Lymphocytes Absolute 0.9 (*)     All other components within normal limits    Narrative:     Performed at:  [facility]  [address] [facility], [address]   Phone [phone]   COMPREHENSIVE METABOLIC PANEL W/ REFLEX TO MG FOR LOW K - Abnormal; Notable for the following components:    Glucose 151 (*)     BUN 33 (*)     CREATININE 1.3 (*)     GFR Non- 38 (*)     GFR  46 (*)     All other components within normal limits    Narrative:     Performed at:  [facility] [address] [facility]   Phone [phone]   URINE RT REFLEX TO CULTURE - Abnormal; Notable for the following components:    Clarity, UA CLOUDY (*)     Blood, Urine LARGE (*)     Protein, UA 30 (*)     Leukocyte Esterase, Urine SMALL (*)     All other components within normal limits    Narrative:     Performed at:  [facility]  [address], [address]   Phone [phone]   MICROSCOPIC URINALYSIS - Abnormal; Notable for the following components:    WBC, UA 12 (*)     RBC,  (*)     All other components within normal limits    Narrative:     Performed at:  [facility]  [address] [facility], [address]   Phone [phone]   CBC WITH AUTO DIFFERENTIAL - Abnormal; Notable for the following components:    RBC 3.08 (*)     Hemoglobin 8.8 (*)     Hematocrit 26.8 (*)     Neutrophils Absolute 8.9 (*)     Lymphocytes Absolute 0.6 (*)     All other components within normal limits    Narrative:     Collection has been rescheduled by EVELIN at 01/25/2022 06:14 Reason:   Patient not in room  Collection has been rescheduled by [facility] EMERGENCY DEPARTMENT AT [facility] at 01/25/2022 06:18 Reason:   Patient not in room  Performed at:  [facility] [address], [facility], [address] [facility]   Phone [phone]   COMPREHENSIVE METABOLIC PANEL W/ REFLEX TO MG FOR LOW K - Abnormal; Notable for the following components:    Glucose 157 (*)     BUN 33 (*)     GFR Non- 42 (*)     GFR  51 (*)     Total Protein 6.2 (*)     All other components within normal limits    Narrative:     Collection has been rescheduled by SALAM at 01/25/2022 06:14 Reason:   Patient not in room  Collection has been rescheduled by [facility] EMERGENCY DEPARTMENT AT [facility] at 01/25/2022 06:18 Reason:   Patient not in room  Performed at:  [facility] [address], [facility], [address] [place] [address]   Phone [phone]   CBC - Abnormal; Notable for the following components:    WBC 12.0 (*)     RBC 2.15 (*)     Hemoglobin 6.1 (*)     Hematocrit 19.0 (*)     Platelets 071 (*)     All other components within normal limits    Narrative:     Jorge HUHGESFIS tel. [phone],  Hematology results called to and read back by margaret dill pacu, 01/25/2022  17:55, by [facility]  Hematology results called to and read back by margaret dill pacu, 01/25/2022  17:53, by [place]  Performed at:  [facility] [address], [facility], [facility]   Phone [phone]   CBC - Abnormal; Notable for the following components:    RBC 2.20 (*)     Hemoglobin 6.2 (*)     Hematocrit 18.7 (*)     RDW 16.1 (*)     Platelets 74 (*)     All other components within normal limits    Narrative:     Jorge HUGHESF4T tel. [phone],  Hematology results called to and read back by China Antony, 01/26/2022  06:42, by Kay Lieberman  Performed at:  [facility] [address], [facility], [address]   Phone [phone]   POCT GLUCOSE - Abnormal; Notable for the following components:    POC Glucose 147 (*)     All other components within normal limits    Narrative:     Performed at:  [facility]  [address],  [place], [address]   Phone [phone], RAPID    Narrative:     Performed at:  [facility]  [address],  [place], [address]   Phone [phone]   CULTURE, URINE    Narrative:     ORDER#: [id number]                          ORDERED BY: Kody Sánchez  SOURCE: Urine Clean Catch                  COLLECTED:  01/24/22 20:00  ANTIBIOTICS AT BRIAN.:                      RECEIVED :  01/25/22 02:39  Performed at:  [facility]  [address] [facility], [facility]   Phone [phone]   TROPONIN    Narrative:     Performed at:  [facility] [address],  [place], [address]   Phone [phone] PEPTIDE    Narrative:     Performed at:  [facility] [address],  [place], [address]   Phone [phone]   PROTIME-INR    Narrative:     Performed at:  [facility] [address],  [place], [address]   Phone [phone]   APTT    Narrative:     Performed at:  [facility] [address],  [place], [address]   Phone [phone]   TYPE AND SCREEN    Narrative:     Performed at:  [facility] [address],  [place], [address]   Phone [phone]   PREPARE RBC (CROSSMATCH)   PREPARE RBC (CROSSMATCH)       When ordered only abnormal lab results are displayed. All other labs were within normal range or not returned as of this dictation. EKG: When ordered, EKG's are interpreted by the Emergency Department Physician in the absence of a cardiologist.  Please see their note for interpretation of EKG.     RADIOLOGY:   Non-plain film images such as CT, Ultrasound and MRI are read by the radiologist. Plain radiographic images are visualized and preliminarily interpreted by the ED Provider with the below findings:        Interpretation per the Radiologist below, if available at the time of this note:    [address]   Final Result      XR HIP LEFT (2-3 VIEWS)   Final Result      CT HEAD WO CONTRAST   Final Result   Stable small focal hyperdensity seen along the medial aspect of the right   temporal lobe felt related to subarachnoid hemorrhage. No new intracranial   hemorrhage or interval worsening. Other similar findings as above. CT HIP LEFT WO CONTRAST   Final Result   Acute intertrochanteric/subtrochanteric fracture of the left femur. CT Cervical Spine WO Contrast   Final Result   Question minimal right medial temporal lobe subarachnoid hemorrhage. Consider short-term follow-up head CT. Multilevel degenerate changes Cervical spine without acute fracture traumatic   malalignment. Critical results were called by Dr. Hugo Zhou to Dr Sharron Mina on 1/24/2022 at   23:23. CT Head WO Contrast   Final Result   Question minimal right medial temporal lobe subarachnoid hemorrhage. Consider short-term follow-up head CT. Multilevel degenerate changes Cervical spine without acute fracture traumatic   malalignment. Critical results were called by Dr. Hugo Zhou to Dr Sharron Mina on 1/24/2022 at   23:23. XR HIP 2-3 VW W PELVIS LEFT   Final Result   Acute left femoral intertrochanteric fracture. XR CHEST PORTABLE   Final Result   No acute cardiopulmonary disease. No results found.         PROCEDURES   Unless otherwise noted below, none     Procedures    CRITICAL CARE TIME       CONSULTS:  IP CONSULT TO INTERNAL MEDICINE  IP CONSULT TO NEUROSURGERY  IP CONSULT TO ORTHOPEDIC SURGERY  IP CONSULT TO NEUROSURGERY  IP CONSULT TO PALLIATIVE CARE  IP CONSULT TO HOSPICE  IP CONSULT TO SOCIAL WORK      EMERGENCY DEPARTMENT COURSE and DIFFERENTIAL DIAGNOSIS/MDM:   Vitals:    Vitals:    01/27/22 0014 01/27/22 0605 01/27/22 0622 01/27/22 0800   BP: (!) 157/77 (!) 147/75  (!) 152/69   Pulse: 65 80  80   Resp: 16 16  16   Temp: 96.8 °F (36 °C) 97.2 °F (36.2 °C)  98 °F (36.7 °C)   TempSrc: Axillary Axillary  Axillary   SpO2: 96%  95% 95%   Weight:       Height:           Patient was given the following medications:  Medications   LORazepam (ATIVAN) tablet 1 mg (1 mg Oral Given 1/24/22 1846)   morphine injection 4 mg (4 mg IntraVENous Given 1/24/22 1935)   LORazepam (ATIVAN) injection 1 mg (1 mg IntraVENous Given 1/24/22 2151)   fentaNYL (SUBLIMAZE) injection 50 mcg (50 mcg IntraVENous Given 1/24/22 2150)   cefTRIAXone (ROCEPHIN) 1000 mg in sterile water 10 mL IV syringe (1,000 mg IntraVENous Given 1/24/22 2213)   ceFAZolin (ANCEF) 2000 mg in dextrose 5 % 50 mL IVPB (0 mg IntraVENous Stopped 1/25/22 1610)   sodium chloride 0.9 % irrigation (1,000 mLs Irrigation New Bag 1/25/22 1620)   bupivacaine (PF) (MARCAINE) 0.25 % injection (30 mLs IntraDERmal Given 1/25/22 1632)         At 7:40 PM I discussed case with attending physician who will assume care management as my shift ends. FINAL IMPRESSION      1. Closed fracture of left hip, initial encounter ([facility] [address] [address]    2. Acute cystitis with hematuria    3. Dementia with behavioral disturbance, unspecified dementia type ([facility] [address]    4. Abnormal CT of the head          DISPOSITION/PLAN   DISPOSITION        PATIENT REFERRED TO:  Michelle Motley MD  [facility] [address]   [address] [place] [address]  [phone]    Schedule an appointment as soon as possible for a visit in 4 weeks        DISCHARGE MEDICATIONS:  Discharge Medication List as of 1/27/2022 10:34 AM      START taking these medications    Details   traMADol (ULTRAM) 50 MG tablet Take 1 tablet by mouth every 8 hours as needed for Pain for up to 3 days. , Disp-12 tablet, R-0Print             DISCONTINUED MEDICATIONS:  Discharge Medication List as of 1/27/2022 10:34 AM                 (Please note that portions of this note were completed with a voice recognition program.  Efforts were made to edit the dictations but occasionally words are mis-transcribed. )    Nita Jeong PA-C (electronically signed)           Nita Jeong PA-C  01/25/22 [address] [facility], [place]  02/11/22 0009

## 2022-01-25 ENCOUNTER — APPOINTMENT (OUTPATIENT)
Dept: GENERAL RADIOLOGY | Age: 87
DRG: 956 | End: 2022-01-25
Payer: MEDICARE

## 2022-01-25 ENCOUNTER — ANESTHESIA (OUTPATIENT)
Dept: OPERATING ROOM | Age: 87
DRG: 956 | End: 2022-01-25
Payer: MEDICARE

## 2022-01-25 ENCOUNTER — APPOINTMENT (OUTPATIENT)
Dept: CT IMAGING | Age: 87
DRG: 956 | End: 2022-01-25
Payer: MEDICARE

## 2022-01-25 ENCOUNTER — ANESTHESIA EVENT (OUTPATIENT)
Dept: OPERATING ROOM | Age: 87
DRG: 956 | End: 2022-01-25
Payer: MEDICARE

## 2022-01-25 VITALS
DIASTOLIC BLOOD PRESSURE: 74 MMHG | OXYGEN SATURATION: 65 % | RESPIRATION RATE: 8 BRPM | TEMPERATURE: 97.3 F | SYSTOLIC BLOOD PRESSURE: 113 MMHG

## 2022-01-25 PROBLEM — S06.6XAA SUBARACHNOID HEMORRHAGE FOLLOWING INJURY: Status: ACTIVE | Noted: 2022-01-25

## 2022-01-25 LAB
A/G RATIO: 1.5 (ref 1.1–2.2)
ABO/RH: NORMAL
ALBUMIN SERPL-MCNC: 3.7 G/DL (ref 3.4–5)
ALP BLD-CCNC: 60 U/L (ref 40–129)
ALT SERPL-CCNC: 13 U/L (ref 10–40)
ANION GAP SERPL CALCULATED.3IONS-SCNC: 13 MMOL/L (ref 3–16)
ANTIBODY SCREEN: NORMAL
AST SERPL-CCNC: 20 U/L (ref 15–37)
BASOPHILS ABSOLUTE: 0 K/UL (ref 0–0.2)
BASOPHILS RELATIVE PERCENT: 0.2 %
BILIRUB SERPL-MCNC: <0.2 MG/DL (ref 0–1)
BLOOD BANK DISPENSE STATUS: NORMAL
BLOOD BANK PRODUCT CODE: NORMAL
BPU ID: NORMAL
BUN BLDV-MCNC: 33 MG/DL (ref 7–20)
CALCIUM SERPL-MCNC: 8.8 MG/DL (ref 8.3–10.6)
CHLORIDE BLD-SCNC: 107 MMOL/L (ref 99–110)
CO2: 24 MMOL/L (ref 21–32)
CREAT SERPL-MCNC: 1.2 MG/DL (ref 0.6–1.2)
DESCRIPTION BLOOD BANK: NORMAL
EKG ATRIAL RATE: 118 BPM
EKG DIAGNOSIS: NORMAL
EKG P AXIS: 72 DEGREES
EKG P-R INTERVAL: 124 MS
EKG Q-T INTERVAL: 304 MS
EKG QRS DURATION: 70 MS
EKG QTC CALCULATION (BAZETT): 426 MS
EKG R AXIS: -12 DEGREES
EKG T AXIS: 83 DEGREES
EKG VENTRICULAR RATE: 118 BPM
EOSINOPHILS ABSOLUTE: 0 K/UL (ref 0–0.6)
EOSINOPHILS RELATIVE PERCENT: 0 %
GFR AFRICAN AMERICAN: 51
GFR NON-AFRICAN AMERICAN: 42
GLUCOSE BLD-MCNC: 147 MG/DL (ref 70–99)
GLUCOSE BLD-MCNC: 157 MG/DL (ref 70–99)
HCT VFR BLD CALC: 19 % (ref 36–48)
HCT VFR BLD CALC: 26.8 % (ref 36–48)
HEMOGLOBIN: 6.1 G/DL (ref 12–16)
HEMOGLOBIN: 8.8 G/DL (ref 12–16)
LYMPHOCYTES ABSOLUTE: 0.6 K/UL (ref 1–5.1)
LYMPHOCYTES RELATIVE PERCENT: 5.5 %
MCH RBC QN AUTO: 28.2 PG (ref 26–34)
MCH RBC QN AUTO: 28.6 PG (ref 26–34)
MCHC RBC AUTO-ENTMCNC: 31.9 G/DL (ref 31–36)
MCHC RBC AUTO-ENTMCNC: 33 G/DL (ref 31–36)
MCV RBC AUTO: 86.8 FL (ref 80–100)
MCV RBC AUTO: 88.5 FL (ref 80–100)
MONOCYTES ABSOLUTE: 1 K/UL (ref 0–1.3)
MONOCYTES RELATIVE PERCENT: 9.2 %
NEUTROPHILS ABSOLUTE: 8.9 K/UL (ref 1.7–7.7)
NEUTROPHILS RELATIVE PERCENT: 85.1 %
PDW BLD-RTO: 14.1 % (ref 12.4–15.4)
PDW BLD-RTO: 14.3 % (ref 12.4–15.4)
PERFORMED ON: ABNORMAL
PLATELET # BLD: 120 K/UL (ref 135–450)
PLATELET # BLD: 169 K/UL (ref 135–450)
PMV BLD AUTO: 8.9 FL (ref 5–10.5)
PMV BLD AUTO: 9.3 FL (ref 5–10.5)
POTASSIUM REFLEX MAGNESIUM: 4.8 MMOL/L (ref 3.5–5.1)
RBC # BLD: 2.15 M/UL (ref 4–5.2)
RBC # BLD: 3.08 M/UL (ref 4–5.2)
SODIUM BLD-SCNC: 144 MMOL/L (ref 136–145)
TOTAL PROTEIN: 6.2 G/DL (ref 6.4–8.2)
URINE CULTURE, ROUTINE: NORMAL
WBC # BLD: 10.4 K/UL (ref 4–11)
WBC # BLD: 12 K/UL (ref 4–11)

## 2022-01-25 PROCEDURE — 2709999900 HC NON-CHARGEABLE SUPPLY: Performed by: ORTHOPAEDIC SURGERY

## 2022-01-25 PROCEDURE — 2500000003 HC RX 250 WO HCPCS: Performed by: ORTHOPAEDIC SURGERY

## 2022-01-25 PROCEDURE — 1200000000 HC SEMI PRIVATE

## 2022-01-25 PROCEDURE — 6360000002 HC RX W HCPCS: Performed by: NURSE PRACTITIONER

## 2022-01-25 PROCEDURE — 6370000000 HC RX 637 (ALT 250 FOR IP): Performed by: NURSE PRACTITIONER

## 2022-01-25 PROCEDURE — 7100000001 HC PACU RECOVERY - ADDTL 15 MIN: Performed by: ORTHOPAEDIC SURGERY

## 2022-01-25 PROCEDURE — 36415 COLL VENOUS BLD VENIPUNCTURE: CPT

## 2022-01-25 PROCEDURE — 0QS704Z REPOSITION LEFT UPPER FEMUR WITH INTERNAL FIXATION DEVICE, OPEN APPROACH: ICD-10-PCS | Performed by: ORTHOPAEDIC SURGERY

## 2022-01-25 PROCEDURE — 85025 COMPLETE CBC W/AUTO DIFF WBC: CPT

## 2022-01-25 PROCEDURE — 3700000000 HC ANESTHESIA ATTENDED CARE: Performed by: ORTHOPAEDIC SURGERY

## 2022-01-25 PROCEDURE — P9045 ALBUMIN (HUMAN), 5%, 250 ML: HCPCS | Performed by: NURSE ANESTHETIST, CERTIFIED REGISTERED

## 2022-01-25 PROCEDURE — 36430 TRANSFUSION BLD/BLD COMPNT: CPT

## 2022-01-25 PROCEDURE — 6360000002 HC RX W HCPCS: Performed by: NURSE ANESTHETIST, CERTIFIED REGISTERED

## 2022-01-25 PROCEDURE — APPNB45 APP NON BILLABLE 31-45 MINUTES: Performed by: NURSE PRACTITIONER

## 2022-01-25 PROCEDURE — C1713 ANCHOR/SCREW BN/BN,TIS/BN: HCPCS | Performed by: ORTHOPAEDIC SURGERY

## 2022-01-25 PROCEDURE — 2580000003 HC RX 258: Performed by: ORTHOPAEDIC SURGERY

## 2022-01-25 PROCEDURE — 3600000004 HC SURGERY LEVEL 4 BASE: Performed by: ORTHOPAEDIC SURGERY

## 2022-01-25 PROCEDURE — 86901 BLOOD TYPING SEROLOGIC RH(D): CPT

## 2022-01-25 PROCEDURE — A4217 STERILE WATER/SALINE, 500 ML: HCPCS | Performed by: ORTHOPAEDIC SURGERY

## 2022-01-25 PROCEDURE — 3700000001 HC ADD 15 MINUTES (ANESTHESIA): Performed by: ORTHOPAEDIC SURGERY

## 2022-01-25 PROCEDURE — 80053 COMPREHEN METABOLIC PANEL: CPT

## 2022-01-25 PROCEDURE — 3209999900 FLUORO FOR SURGICAL PROCEDURES

## 2022-01-25 PROCEDURE — 7100000000 HC PACU RECOVERY - FIRST 15 MIN: Performed by: ORTHOPAEDIC SURGERY

## 2022-01-25 PROCEDURE — 6370000000 HC RX 637 (ALT 250 FOR IP): Performed by: INTERNAL MEDICINE

## 2022-01-25 PROCEDURE — 2780000010 HC IMPLANT OTHER: Performed by: ORTHOPAEDIC SURGERY

## 2022-01-25 PROCEDURE — 73502 X-RAY EXAM HIP UNI 2-3 VIEWS: CPT

## 2022-01-25 PROCEDURE — 85027 COMPLETE CBC AUTOMATED: CPT

## 2022-01-25 PROCEDURE — P9016 RBC LEUKOCYTES REDUCED: HCPCS

## 2022-01-25 PROCEDURE — 6360000002 HC RX W HCPCS: Performed by: INTERNAL MEDICINE

## 2022-01-25 PROCEDURE — 6370000000 HC RX 637 (ALT 250 FOR IP): Performed by: ORTHOPAEDIC SURGERY

## 2022-01-25 PROCEDURE — 2720000010 HC SURG SUPPLY STERILE: Performed by: ORTHOPAEDIC SURGERY

## 2022-01-25 PROCEDURE — 70450 CT HEAD/BRAIN W/O DYE: CPT

## 2022-01-25 PROCEDURE — 93010 ELECTROCARDIOGRAM REPORT: CPT | Performed by: INTERNAL MEDICINE

## 2022-01-25 PROCEDURE — 3600000014 HC SURGERY LEVEL 4 ADDTL 15MIN: Performed by: ORTHOPAEDIC SURGERY

## 2022-01-25 PROCEDURE — 2580000003 HC RX 258: Performed by: INTERNAL MEDICINE

## 2022-01-25 PROCEDURE — C1769 GUIDE WIRE: HCPCS | Performed by: ORTHOPAEDIC SURGERY

## 2022-01-25 PROCEDURE — 2580000003 HC RX 258: Performed by: NURSE ANESTHETIST, CERTIFIED REGISTERED

## 2022-01-25 PROCEDURE — 86850 RBC ANTIBODY SCREEN: CPT

## 2022-01-25 PROCEDURE — 86900 BLOOD TYPING SEROLOGIC ABO: CPT

## 2022-01-25 PROCEDURE — 2500000003 HC RX 250 WO HCPCS: Performed by: NURSE ANESTHETIST, CERTIFIED REGISTERED

## 2022-01-25 PROCEDURE — 86923 COMPATIBILITY TEST ELECTRIC: CPT

## 2022-01-25 PROCEDURE — 6360000002 HC RX W HCPCS: Performed by: ORTHOPAEDIC SURGERY

## 2022-01-25 DEVICE — SCREW BNE L34MM DIA5MM TIB LT GRN TI ST CANN LOK FULL THRD: Type: IMPLANTABLE DEVICE | Site: HIP | Status: FUNCTIONAL

## 2022-01-25 DEVICE — IMPLANTABLE DEVICE: Type: IMPLANTABLE DEVICE | Site: HIP | Status: FUNCTIONAL

## 2022-01-25 DEVICE — NAIL IM L235MM DIA11MM 130DEG SHT L PROX FEM GRN TI CANN: Type: IMPLANTABLE DEVICE | Site: HIP | Status: FUNCTIONAL

## 2022-01-25 RX ORDER — ONDANSETRON 2 MG/ML
4 INJECTION INTRAMUSCULAR; INTRAVENOUS EVERY 6 HOURS PRN
Status: DISCONTINUED | OUTPATIENT
Start: 2022-01-25 | End: 2022-01-27 | Stop reason: HOSPADM

## 2022-01-25 RX ORDER — PROPOFOL 10 MG/ML
INJECTION, EMULSION INTRAVENOUS PRN
Status: DISCONTINUED | OUTPATIENT
Start: 2022-01-25 | End: 2022-01-25 | Stop reason: SDUPTHER

## 2022-01-25 RX ORDER — MORPHINE SULFATE 2 MG/ML
2 INJECTION, SOLUTION INTRAMUSCULAR; INTRAVENOUS
Status: DISCONTINUED | OUTPATIENT
Start: 2022-01-25 | End: 2022-01-27 | Stop reason: HOSPADM

## 2022-01-25 RX ORDER — POTASSIUM CHLORIDE 7.45 MG/ML
10 INJECTION INTRAVENOUS PRN
Status: DISCONTINUED | OUTPATIENT
Start: 2022-01-25 | End: 2022-01-25 | Stop reason: SDUPTHER

## 2022-01-25 RX ORDER — POTASSIUM CHLORIDE 20 MEQ/1
40 TABLET, EXTENDED RELEASE ORAL PRN
Status: DISCONTINUED | OUTPATIENT
Start: 2022-01-25 | End: 2022-01-25 | Stop reason: SDUPTHER

## 2022-01-25 RX ORDER — FENTANYL CITRATE 50 UG/ML
INJECTION, SOLUTION INTRAMUSCULAR; INTRAVENOUS PRN
Status: DISCONTINUED | OUTPATIENT
Start: 2022-01-25 | End: 2022-01-25 | Stop reason: SDUPTHER

## 2022-01-25 RX ORDER — CLONIDINE HYDROCHLORIDE 0.1 MG/1
0.1 TABLET ORAL 2 TIMES DAILY
Status: DISCONTINUED | OUTPATIENT
Start: 2022-01-25 | End: 2022-01-27 | Stop reason: HOSPADM

## 2022-01-25 RX ORDER — OXYCODONE HYDROCHLORIDE 5 MG/1
5 TABLET ORAL EVERY 4 HOURS PRN
Status: DISCONTINUED | OUTPATIENT
Start: 2022-01-25 | End: 2022-01-25 | Stop reason: ALTCHOICE

## 2022-01-25 RX ORDER — LABETALOL HYDROCHLORIDE 5 MG/ML
5 INJECTION, SOLUTION INTRAVENOUS EVERY 10 MIN PRN
Status: DISCONTINUED | OUTPATIENT
Start: 2022-01-25 | End: 2022-01-25 | Stop reason: HOSPADM

## 2022-01-25 RX ORDER — PANTOPRAZOLE SODIUM 40 MG/1
40 TABLET, DELAYED RELEASE ORAL
Status: DISCONTINUED | OUTPATIENT
Start: 2022-01-25 | End: 2022-01-27 | Stop reason: HOSPADM

## 2022-01-25 RX ORDER — VALSARTAN AND HYDROCHLOROTHIAZIDE 160; 25 MG/1; MG/1
1 TABLET ORAL DAILY
Status: DISCONTINUED | OUTPATIENT
Start: 2022-01-25 | End: 2022-01-25 | Stop reason: CLARIF

## 2022-01-25 RX ORDER — ACETAMINOPHEN 325 MG/1
650 TABLET ORAL 3 TIMES DAILY
Status: DISCONTINUED | OUTPATIENT
Start: 2022-01-25 | End: 2022-01-27 | Stop reason: HOSPADM

## 2022-01-25 RX ORDER — SODIUM CHLORIDE 9 MG/ML
INJECTION, SOLUTION INTRAVENOUS CONTINUOUS
Status: DISCONTINUED | OUTPATIENT
Start: 2022-01-25 | End: 2022-01-27 | Stop reason: HOSPADM

## 2022-01-25 RX ORDER — HYDROMORPHONE HCL 110MG/55ML
0.25 PATIENT CONTROLLED ANALGESIA SYRINGE INTRAVENOUS EVERY 5 MIN PRN
Status: DISCONTINUED | OUTPATIENT
Start: 2022-01-25 | End: 2022-01-25 | Stop reason: HOSPADM

## 2022-01-25 RX ORDER — SODIUM CHLORIDE 0.9 % (FLUSH) 0.9 %
5-40 SYRINGE (ML) INJECTION EVERY 12 HOURS SCHEDULED
Status: DISCONTINUED | OUTPATIENT
Start: 2022-01-25 | End: 2022-01-27 | Stop reason: HOSPADM

## 2022-01-25 RX ORDER — HYDRALAZINE HYDROCHLORIDE 20 MG/ML
5 INJECTION INTRAMUSCULAR; INTRAVENOUS EVERY 10 MIN PRN
Status: DISCONTINUED | OUTPATIENT
Start: 2022-01-25 | End: 2022-01-25 | Stop reason: HOSPADM

## 2022-01-25 RX ORDER — TRAMADOL HYDROCHLORIDE 50 MG/1
50 TABLET ORAL EVERY 8 HOURS PRN
Status: DISCONTINUED | OUTPATIENT
Start: 2022-01-25 | End: 2022-01-27 | Stop reason: HOSPADM

## 2022-01-25 RX ORDER — ALLOPURINOL 100 MG/1
100 TABLET ORAL 2 TIMES DAILY
Status: DISCONTINUED | OUTPATIENT
Start: 2022-01-25 | End: 2022-01-27 | Stop reason: HOSPADM

## 2022-01-25 RX ORDER — HYDROMORPHONE HCL 110MG/55ML
0.5 PATIENT CONTROLLED ANALGESIA SYRINGE INTRAVENOUS
Status: DISCONTINUED | OUTPATIENT
Start: 2022-01-25 | End: 2022-01-25

## 2022-01-25 RX ORDER — FAMOTIDINE 20 MG/1
20 TABLET, FILM COATED ORAL DAILY
Status: DISCONTINUED | OUTPATIENT
Start: 2022-01-25 | End: 2022-01-27 | Stop reason: HOSPADM

## 2022-01-25 RX ORDER — LANOLIN ALCOHOL/MO/W.PET/CERES
3 CREAM (GRAM) TOPICAL NIGHTLY PRN
Status: DISCONTINUED | OUTPATIENT
Start: 2022-01-25 | End: 2022-01-27 | Stop reason: HOSPADM

## 2022-01-25 RX ORDER — SODIUM CHLORIDE 0.9 % (FLUSH) 0.9 %
10 SYRINGE (ML) INJECTION PRN
Status: DISCONTINUED | OUTPATIENT
Start: 2022-01-25 | End: 2022-01-27 | Stop reason: HOSPADM

## 2022-01-25 RX ORDER — MORPHINE SULFATE 4 MG/ML
4 INJECTION, SOLUTION INTRAMUSCULAR; INTRAVENOUS
Status: DISCONTINUED | OUTPATIENT
Start: 2022-01-25 | End: 2022-01-27 | Stop reason: HOSPADM

## 2022-01-25 RX ORDER — SODIUM CHLORIDE 9 MG/ML
INJECTION, SOLUTION INTRAVENOUS CONTINUOUS PRN
Status: DISCONTINUED | OUTPATIENT
Start: 2022-01-25 | End: 2022-01-25 | Stop reason: SDUPTHER

## 2022-01-25 RX ORDER — SODIUM CHLORIDE 9 MG/ML
25 INJECTION, SOLUTION INTRAVENOUS PRN
Status: DISCONTINUED | OUTPATIENT
Start: 2022-01-25 | End: 2022-01-27 | Stop reason: HOSPADM

## 2022-01-25 RX ORDER — SODIUM CHLORIDE 9 MG/ML
INJECTION, SOLUTION INTRAVENOUS PRN
Status: DISCONTINUED | OUTPATIENT
Start: 2022-01-25 | End: 2022-01-27 | Stop reason: HOSPADM

## 2022-01-25 RX ORDER — OXYCODONE HYDROCHLORIDE 5 MG/1
5 TABLET ORAL EVERY 4 HOURS PRN
Status: DISCONTINUED | OUTPATIENT
Start: 2022-01-25 | End: 2022-01-25

## 2022-01-25 RX ORDER — ALBUMIN, HUMAN INJ 5% 5 %
SOLUTION INTRAVENOUS PRN
Status: DISCONTINUED | OUTPATIENT
Start: 2022-01-25 | End: 2022-01-25 | Stop reason: SDUPTHER

## 2022-01-25 RX ORDER — MAGNESIUM HYDROXIDE 1200 MG/15ML
LIQUID ORAL CONTINUOUS PRN
Status: COMPLETED | OUTPATIENT
Start: 2022-01-25 | End: 2022-01-25

## 2022-01-25 RX ORDER — ONDANSETRON 4 MG/1
4 TABLET, ORALLY DISINTEGRATING ORAL EVERY 8 HOURS PRN
Status: DISCONTINUED | OUTPATIENT
Start: 2022-01-25 | End: 2022-01-27 | Stop reason: HOSPADM

## 2022-01-25 RX ORDER — BUPIVACAINE HYDROCHLORIDE 2.5 MG/ML
INJECTION, SOLUTION EPIDURAL; INFILTRATION; INTRACAUDAL
Status: COMPLETED | OUTPATIENT
Start: 2022-01-25 | End: 2022-01-25

## 2022-01-25 RX ORDER — TRAMADOL HYDROCHLORIDE 50 MG/1
50 TABLET ORAL EVERY 4 HOURS PRN
Status: DISCONTINUED | OUTPATIENT
Start: 2022-01-25 | End: 2022-01-25

## 2022-01-25 RX ORDER — DONEPEZIL HYDROCHLORIDE 5 MG/1
5 TABLET, FILM COATED ORAL NIGHTLY
Status: DISCONTINUED | OUTPATIENT
Start: 2022-01-25 | End: 2022-01-27 | Stop reason: HOSPADM

## 2022-01-25 RX ORDER — HYDROMORPHONE HCL 110MG/55ML
0.25 PATIENT CONTROLLED ANALGESIA SYRINGE INTRAVENOUS
Status: DISCONTINUED | OUTPATIENT
Start: 2022-01-25 | End: 2022-01-25

## 2022-01-25 RX ORDER — VALSARTAN 160 MG/1
160 TABLET ORAL DAILY
Status: DISCONTINUED | OUTPATIENT
Start: 2022-01-25 | End: 2022-01-27 | Stop reason: HOSPADM

## 2022-01-25 RX ORDER — ONDANSETRON 2 MG/ML
INJECTION INTRAMUSCULAR; INTRAVENOUS PRN
Status: DISCONTINUED | OUTPATIENT
Start: 2022-01-25 | End: 2022-01-25 | Stop reason: SDUPTHER

## 2022-01-25 RX ORDER — DEXAMETHASONE SODIUM PHOSPHATE 4 MG/ML
INJECTION, SOLUTION INTRA-ARTICULAR; INTRALESIONAL; INTRAMUSCULAR; INTRAVENOUS; SOFT TISSUE PRN
Status: DISCONTINUED | OUTPATIENT
Start: 2022-01-25 | End: 2022-01-25 | Stop reason: SDUPTHER

## 2022-01-25 RX ORDER — SUCRALFATE 1 G/1
1 TABLET ORAL EVERY 6 HOURS SCHEDULED
Status: DISCONTINUED | OUTPATIENT
Start: 2022-01-25 | End: 2022-01-27 | Stop reason: HOSPADM

## 2022-01-25 RX ORDER — OXYCODONE HYDROCHLORIDE 5 MG/1
10 TABLET ORAL EVERY 4 HOURS PRN
Status: DISCONTINUED | OUTPATIENT
Start: 2022-01-25 | End: 2022-01-25

## 2022-01-25 RX ORDER — HYDRALAZINE HYDROCHLORIDE 20 MG/ML
10 INJECTION INTRAMUSCULAR; INTRAVENOUS EVERY 6 HOURS PRN
Status: DISCONTINUED | OUTPATIENT
Start: 2022-01-25 | End: 2022-01-27 | Stop reason: HOSPADM

## 2022-01-25 RX ORDER — POTASSIUM CHLORIDE 20 MEQ/1
40 TABLET, EXTENDED RELEASE ORAL PRN
Status: DISCONTINUED | OUTPATIENT
Start: 2022-01-25 | End: 2022-01-27 | Stop reason: HOSPADM

## 2022-01-25 RX ORDER — QUETIAPINE FUMARATE 25 MG/1
50 TABLET, FILM COATED ORAL NIGHTLY
Status: DISCONTINUED | OUTPATIENT
Start: 2022-01-25 | End: 2022-01-27 | Stop reason: HOSPADM

## 2022-01-25 RX ORDER — LIDOCAINE HYDROCHLORIDE 20 MG/ML
INJECTION, SOLUTION EPIDURAL; INFILTRATION; INTRACAUDAL; PERINEURAL PRN
Status: DISCONTINUED | OUTPATIENT
Start: 2022-01-25 | End: 2022-01-25 | Stop reason: SDUPTHER

## 2022-01-25 RX ORDER — 0.9 % SODIUM CHLORIDE 0.9 %
500 INTRAVENOUS SOLUTION INTRAVENOUS PRN
Status: DISCONTINUED | OUTPATIENT
Start: 2022-01-25 | End: 2022-01-27 | Stop reason: HOSPADM

## 2022-01-25 RX ORDER — SODIUM CHLORIDE 0.9 % (FLUSH) 0.9 %
5-40 SYRINGE (ML) INJECTION PRN
Status: DISCONTINUED | OUTPATIENT
Start: 2022-01-25 | End: 2022-01-27 | Stop reason: HOSPADM

## 2022-01-25 RX ORDER — METOPROLOL SUCCINATE 25 MG/1
25 TABLET, EXTENDED RELEASE ORAL DAILY
Status: DISCONTINUED | OUTPATIENT
Start: 2022-01-25 | End: 2022-01-27 | Stop reason: HOSPADM

## 2022-01-25 RX ORDER — OXYCODONE HYDROCHLORIDE 5 MG/1
10 TABLET ORAL EVERY 4 HOURS PRN
Status: DISCONTINUED | OUTPATIENT
Start: 2022-01-25 | End: 2022-01-25 | Stop reason: ALTCHOICE

## 2022-01-25 RX ORDER — HYDROCHLOROTHIAZIDE 25 MG/1
25 TABLET ORAL DAILY
Status: DISCONTINUED | OUTPATIENT
Start: 2022-01-25 | End: 2022-01-27 | Stop reason: HOSPADM

## 2022-01-25 RX ORDER — ONDANSETRON 2 MG/ML
4 INJECTION INTRAMUSCULAR; INTRAVENOUS
Status: DISCONTINUED | OUTPATIENT
Start: 2022-01-25 | End: 2022-01-25 | Stop reason: HOSPADM

## 2022-01-25 RX ORDER — POLYETHYLENE GLYCOL 3350 17 G/17G
17 POWDER, FOR SOLUTION ORAL DAILY PRN
Status: DISCONTINUED | OUTPATIENT
Start: 2022-01-25 | End: 2022-01-27 | Stop reason: HOSPADM

## 2022-01-25 RX ORDER — POTASSIUM CHLORIDE 7.45 MG/ML
10 INJECTION INTRAVENOUS PRN
Status: DISCONTINUED | OUTPATIENT
Start: 2022-01-25 | End: 2022-01-27 | Stop reason: HOSPADM

## 2022-01-25 RX ADMIN — MORPHINE SULFATE 4 MG: 4 INJECTION INTRAVENOUS at 04:22

## 2022-01-25 RX ADMIN — HYDRALAZINE HYDROCHLORIDE 10 MG: 20 INJECTION INTRAMUSCULAR; INTRAVENOUS at 04:24

## 2022-01-25 RX ADMIN — ALBUMIN (HUMAN) 12.5 G: 12.5 INJECTION, SOLUTION INTRAVENOUS at 16:48

## 2022-01-25 RX ADMIN — SODIUM CHLORIDE: 9 INJECTION, SOLUTION INTRAVENOUS at 15:44

## 2022-01-25 RX ADMIN — ALLOPURINOL 100 MG: 100 TABLET ORAL at 23:07

## 2022-01-25 RX ADMIN — FENTANYL CITRATE 25 MCG: 50 INJECTION, SOLUTION INTRAMUSCULAR; INTRAVENOUS at 15:48

## 2022-01-25 RX ADMIN — CLONIDINE HYDROCHLORIDE 0.1 MG: 0.1 TABLET ORAL at 23:07

## 2022-01-25 RX ADMIN — ONDANSETRON 4 MG: 2 INJECTION INTRAMUSCULAR; INTRAVENOUS at 16:00

## 2022-01-25 RX ADMIN — QUETIAPINE FUMARATE 50 MG: 25 TABLET ORAL at 23:08

## 2022-01-25 RX ADMIN — PHENYLEPHRINE HYDROCHLORIDE 50 MCG: 10 INJECTION INTRAVENOUS at 16:33

## 2022-01-25 RX ADMIN — FENTANYL CITRATE 25 MCG: 50 INJECTION, SOLUTION INTRAMUSCULAR; INTRAVENOUS at 16:53

## 2022-01-25 RX ADMIN — DEXAMETHASONE SODIUM PHOSPHATE 4 MG: 4 INJECTION, SOLUTION INTRAMUSCULAR; INTRAVENOUS at 16:00

## 2022-01-25 RX ADMIN — FENTANYL CITRATE 25 MCG: 50 INJECTION, SOLUTION INTRAMUSCULAR; INTRAVENOUS at 16:16

## 2022-01-25 RX ADMIN — PROPOFOL 50 MG: 10 INJECTION, EMULSION INTRAVENOUS at 15:48

## 2022-01-25 RX ADMIN — DONEPEZIL HYDROCHLORIDE 5 MG: 5 TABLET, FILM COATED ORAL at 23:07

## 2022-01-25 RX ADMIN — SODIUM CHLORIDE: 9 INJECTION, SOLUTION INTRAVENOUS at 04:16

## 2022-01-25 RX ADMIN — METOPROLOL SUCCINATE 25 MG: 25 TABLET, FILM COATED, EXTENDED RELEASE ORAL at 09:00

## 2022-01-25 RX ADMIN — FENTANYL CITRATE 25 MCG: 50 INJECTION, SOLUTION INTRAMUSCULAR; INTRAVENOUS at 16:07

## 2022-01-25 RX ADMIN — ACETAMINOPHEN 650 MG: 325 TABLET ORAL at 23:06

## 2022-01-25 RX ADMIN — MORPHINE SULFATE 2 MG: 2 INJECTION, SOLUTION INTRAMUSCULAR; INTRAVENOUS at 23:38

## 2022-01-25 RX ADMIN — Medication 1000 MG: at 23:57

## 2022-01-25 RX ADMIN — FAMOTIDINE 20 MG: 20 TABLET, FILM COATED ORAL at 23:07

## 2022-01-25 RX ADMIN — CEFAZOLIN 2000 MG: 10 INJECTION, POWDER, FOR SOLUTION INTRAVENOUS; PARENTERAL at 15:40

## 2022-01-25 RX ADMIN — MORPHINE SULFATE 2 MG: 2 INJECTION, SOLUTION INTRAMUSCULAR; INTRAVENOUS at 19:02

## 2022-01-25 RX ADMIN — SODIUM CHLORIDE: 9 INJECTION, SOLUTION INTRAVENOUS at 18:29

## 2022-01-25 RX ADMIN — LIDOCAINE HYDROCHLORIDE 50 MG: 20 INJECTION, SOLUTION EPIDURAL; INFILTRATION; INTRACAUDAL; PERINEURAL at 15:48

## 2022-01-25 RX ADMIN — SUCRALFATE 1 G: 1 TABLET ORAL at 23:06

## 2022-01-25 ASSESSMENT — PULMONARY FUNCTION TESTS
PIF_VALUE: 8
PIF_VALUE: 8
PIF_VALUE: 16
PIF_VALUE: 5
PIF_VALUE: 6
PIF_VALUE: 3
PIF_VALUE: 16
PIF_VALUE: 8
PIF_VALUE: 3
PIF_VALUE: 17
PIF_VALUE: 4
PIF_VALUE: 2
PIF_VALUE: 16
PIF_VALUE: 16
PIF_VALUE: 1
PIF_VALUE: 8
PIF_VALUE: 9
PIF_VALUE: 8
PIF_VALUE: 9
PIF_VALUE: 10
PIF_VALUE: 16
PIF_VALUE: 9
PIF_VALUE: 8
PIF_VALUE: 3
PIF_VALUE: 16
PIF_VALUE: 9
PIF_VALUE: 16
PIF_VALUE: 3
PIF_VALUE: 8
PIF_VALUE: 5
PIF_VALUE: 2
PIF_VALUE: 2
PIF_VALUE: 16
PIF_VALUE: 2
PIF_VALUE: 16
PIF_VALUE: 3
PIF_VALUE: 5
PIF_VALUE: 1
PIF_VALUE: 8
PIF_VALUE: 12
PIF_VALUE: 11
PIF_VALUE: 16
PIF_VALUE: 16
PIF_VALUE: 2
PIF_VALUE: 3
PIF_VALUE: 16
PIF_VALUE: 16
PIF_VALUE: 8
PIF_VALUE: 11
PIF_VALUE: 1
PIF_VALUE: 1
PIF_VALUE: 2
PIF_VALUE: 10
PIF_VALUE: 16
PIF_VALUE: 8
PIF_VALUE: 12
PIF_VALUE: 16
PIF_VALUE: 8
PIF_VALUE: 16
PIF_VALUE: 2
PIF_VALUE: 0
PIF_VALUE: 8
PIF_VALUE: 16
PIF_VALUE: 8
PIF_VALUE: 9

## 2022-01-25 ASSESSMENT — LIFESTYLE VARIABLES: SMOKING_STATUS: 0

## 2022-01-25 ASSESSMENT — PAIN SCALES - WONG BAKER: WONGBAKER_NUMERICALRESPONSE: 8

## 2022-01-25 ASSESSMENT — PAIN SCALES - GENERAL
PAINLEVEL_OUTOF10: 6
PAINLEVEL_OUTOF10: 8
PAINLEVEL_OUTOF10: 6

## 2022-01-25 NOTE — CONSULTS
Neurosurgery Consult Note    Reason for Consult: possible tSAH  Requesting Provider: Dr. Daryle Sailors    Subjective:  [facility] / HPI:  Simin Escobedo is a 80 y.o. female patient being seen for abnormal CT head. Patient with a history of dementia, resides at a nursing home. Yesterday apparently fell around 5 PM with complaints of left hip pain. Workup in the ED revealed hip fracture and orthopedics is considering hip surgery today or tomorrow. Because of the fall he had was obtained which is of very poor quality and perhaps some questionable minimal right medial temporal lobe signal changes possibly consistent with subarachnoid hemorrhage. Patient had repeat CT of the head which shows unchanged small focal hyperdensity along the medial aspect of the right temporal lobe. Patient denies any headache, baseline dementia noted. Patient Trying to take the covers off to get out of bed. Per ER record patient is DNR CCA and the daughter who is the POA does not wish any type of cranial surgery should patient need one.     Past Medical History:   Diagnosis Date    Disorder of bone and cartilage, unspecified     Esophagitis, unspecified     Gout, unspecified     Hx of colonic polyps     Hyperthyroidism     Memory loss     Osteoarthrosis, unspecified whether generalized or localized, unspecified site     Pedal edema     Unspecified essential hypertension      Past Surgical History:   Procedure Laterality Date    ANGIOPLASTY Bilateral     -renal art stenosis,      Actonel [risedronate sodium], Alendronate sodium, Altace [ramipril], Boniva [ibandronic acid], Centrax [prazepam], Climara [estradiol], Doxycycline calcium [doxycycline calcium], Estrogenic substance, Metoprolol, Mobic [meloxicam], Pcn [penicillins], Sulfa antibiotics, Triamterene-hctz, Vioxx [rofecoxib], and Dyazide [hydrochlorothiazide w-triamterene]    Current Facility-Administered Medications:     pantoprazole (PROTONIX) tablet 40 mg, 40 mg, Oral, QAM Klever Florian MD    cloNIDine (CATAPRES) tablet 0.1 mg, 0.1 mg, Oral, BID, Ethel Ortega MD    metoprolol succinate (TOPROL XL) extended release tablet 25 mg, 25 mg, Oral, Daily, Ethel Ortega MD    allopurinol (ZYLOPRIM) tablet 100 mg, 100 mg, Oral, BID, Ethel Ortega MD    QUEtiapine (SEROQUEL) tablet 50 mg, 50 mg, Oral, Nightly, Ethel Ortega MD    donepezil (ARICEPT) tablet 5 mg, 5 mg, Oral, Nightly, Ethel Ortega MD    melatonin tablet 3 mg, 3 mg, Oral, Nightly PRN, Ethel Otrega MD    sucralfate (CARAFATE) tablet 1 g, 1 g, Oral, 4 times per day, Ethel Ortega MD    0.9 % sodium chloride infusion, , IntraVENous, Continuous, Ethel Ortega MD, Last Rate: 75 mL/hr at 01/25/22 0416, New Bag at 01/25/22 0416    sodium chloride flush 0.9 % injection 5-40 mL, 5-40 mL, IntraVENous, 2 times per day, Ethel Ortega MD    sodium chloride flush 0.9 % injection 10 mL, 10 mL, IntraVENous, PRN, Ethel Ortega MD    0.9 % sodium chloride infusion, 25 mL, IntraVENous, PRN, Ethel Ortega MD    oxyCODONE (ROXICODONE) immediate release tablet 5 mg, 5 mg, Oral, Q4H PRN **OR** oxyCODONE (ROXICODONE) immediate release tablet 10 mg, 10 mg, Oral, Q4H PRN, Ethel Ortega MD    morphine (PF) injection 2 mg, 2 mg, IntraVENous, Q2H PRN **OR** morphine injection 4 mg, 4 mg, IntraVENous, Q2H PRN, Ethel Ortega MD, 4 mg at 01/25/22 0422    ondansetron (ZOFRAN-ODT) disintegrating tablet 4 mg, 4 mg, Oral, Q8H PRN **OR** ondansetron (ZOFRAN) injection 4 mg, 4 mg, IntraVENous, Q6H PRN, Ethel Ortega MD    magnesium hydroxide (MILK OF MAGNESIA) 400 MG/5ML suspension 30 mL, 30 mL, Oral, Daily PRN, Ethel Ortega MD    potassium chloride (KLOR-CON M) extended release tablet 40 mEq, 40 mEq, Oral, PRN **OR** potassium bicarb-citric acid (EFFER-K) effervescent tablet 40 mEq, 40 mEq, Oral, PRN **OR** potassium chloride 10 mEq/100 mL IVPB (Peripheral Line), 10 mEq, IntraVENous, PRN, Doyal Killer Sheree Doll MD    cefTRIAXone (ROCEPHIN) 1000 mg in sterile water 10 mL IV syringe, 1,000 mg, IntraVENous, Q24H, Garrett Anders MD    hydrALAZINE (APRESOLINE) injection 10 mg, 10 mg, IntraVENous, Q6H PRN, Garrett Anders MD, 10 mg at 01/25/22 0424    0.9 % sodium chloride bolus, 500 mL, IntraVENous, PRN, Garrett Anders MD    valsartan (DIOVAN) tablet 160 mg, 160 mg, Oral, Daily **AND** hydroCHLOROthiazide (HYDRODIURIL) tablet 25 mg, 25 mg, Oral, Daily, Garrett Anders MD    0.9 % sodium chloride infusion, 1,000 mL, IntraVENous, Continuous, Maria G Mcelroy PA-C, Stopped at 01/25/22 3160  Social History     Socioeconomic History    Marital status:      Spouse name: Not on file    Number of children: Not on file    Years of education: Not on file    Highest education level: Not on file   Occupational History    Not on file   Tobacco Use    Smoking status: Never Smoker    Smokeless tobacco: Never Used   Substance and Sexual Activity    Alcohol use: No    Drug use: No    Sexual activity: Not on file   Other Topics Concern    Not on file   Social History Narrative    Not on file     Social Determinants of Health     Financial Resource Strain:     Difficulty of Paying Living Expenses: Not on file   Food Insecurity:     Worried About [address] in the Last Year: Not on file    Pavel of Food in the Last Year: Not on file   Transportation Needs:     Lack of Transportation (Medical): Not on file    Lack of Transportation (Non-Medical):  Not on file   Physical Activity:     Days of Exercise per Week: Not on file    Minutes of Exercise per Session: Not on file   Stress:     Feeling of Stress : Not on file   Social Connections:     Frequency of Communication with Friends and Family: Not on file    Frequency of Social Gatherings with Friends and Family: Not on file    Attends Hindu Services: Not on file    Active Member of Clubs or Organizations: Not on file    Attends [employer] or Organization Meetings: Not on file    Marital Status: Not on file   Intimate Partner Violence:     Fear of Current or Ex-Partner: Not on file    Emotionally Abused: Not on file    Physically Abused: Not on file    Sexually Abused: Not on file   Housing Stability:     Unable to Pay for Housing in the Last Year: Not on file    Number of [place] in the Last Year: Not on file    Unstable Housing in the Last Year: Not on file     History reviewed. No pertinent family history. ROS: denies headache however patient with dementia and is unreliable historian. PHYSICAL EXAMINATION:  BP (!) 149/70   Pulse 115   Temp 97.5 °F (36.4 °C) (Axillary)   Resp 16   Ht 4' 10\" (1.473 m)   Wt 110 lb (49.9 kg)   SpO2 100%   BMI 22.99 kg/m²   GENERAL:  Patient easily awoken to name, alert to name only  HEENT:  sclera clear and neck supple  NEUROLOGIC:  Awake, alert, oriented to name. Cranial nerves II-XII are grossly intact.   Motor is symmetric throughout but limited movement of left leg due to pain with movement    LABORATORY DATA:   CBC with Differential:    Lab Results   Component Value Date    WBC 10.4 01/25/2022    RBC 3.08 01/25/2022    HGB 8.8 01/25/2022    HCT 26.8 01/25/2022     01/25/2022    MCV 86.8 01/25/2022    MCH 28.6 01/25/2022    MCHC 33.0 01/25/2022    RDW 14.1 01/25/2022    SEGSPCT 55.2 01/11/2013    BANDSPCT 2 01/24/2022    LYMPHOPCT 5.5 01/25/2022    MONOPCT 9.2 01/25/2022    EOSPCT 2.1 12/13/2011    BASOPCT 0.2 01/25/2022    MONOSABS 1.0 01/25/2022    LYMPHSABS 0.6 01/25/2022    EOSABS 0.0 01/25/2022    BASOSABS 0.0 01/25/2022    DIFFTYPE Auto-K 01/11/2013     CMP:    Lab Results   Component Value Date     01/25/2022    K 4.8 01/25/2022     01/25/2022    CO2 24 01/25/2022    BUN 33 01/25/2022    CREATININE 1.2 01/25/2022    GFRAA 51 01/25/2022    GFRAA 55 01/11/2013    AGRATIO 1.5 01/25/2022    LABGLOM 42 01/25/2022    GLUCOSE 157 01/25/2022    PROT 6.2 01/25/2022    PROT 7.9 01/11/2013    LABALBU 3.7 01/25/2022    CALCIUM 8.8 01/25/2022    BILITOT <0.2 01/25/2022    ALKPHOS 60 01/25/2022    AST 20 01/25/2022    ALT 13 01/25/2022     PT/INR:    Lab Results   Component Value Date    PROTIME 11.0 01/24/2022    INR 0.97 01/24/2022     PTT:    Lab Results   Component Value Date    APTT 28.8 01/24/2022   [APTT}     IMAGING STUDIES:   CT of the Brain:  Date: 1/25/22 6:31 am; Result:   FINDINGS:   The study is degraded by motion.       BRAIN/VENTRICLES: Again identified is a focal area of minimal extra-axial   hyperdensity seen along the medial aspect of the right temporal lobe,   unchanged from the previous examination, and best seen on axial image 21 of   series 2.  No new area of intracranial hemorrhage is identified.  Generalized   age-appropriate cortical atrophy is identified.       ORBITS: The visualized portion of the orbits demonstrate no acute abnormality.       SINUSES: Right mastoid effusion.  No osseous destructive changes are   identified.  Mild chronic paranasal sinus disease.       SOFT TISSUES/SKULL:  No acute abnormality of the visualized skull or soft   tissues.           Impression   Stable small focal hyperdensity seen along the medial aspect of the right   temporal lobe felt related to subarachnoid hemorrhage.  No new intracranial   hemorrhage or interval worsening.       Other similar findings as above. IMPRESSION/PLAN:  Principal Problem:    Closed left hip fracture (HCC)  Plan: ortho following  Active Problems:    Essential hypertension    Osteoarthritis    Age-related osteoporosis without current pathological fracture    UTI (urinary tract infection)    Hip fracture requiring operative repair, left, closed, initial encounter ([address]  Subarachnoid hemorrhage following injury ([address]  Plan: Possible traumatic SAH vs artifact, not in common location for tSAH.   PT is DNRcc and per review of ER note, daughter the POA does not want any brain surgery should pt have clinical decline. Pt with baseline dementia. Hold ASA x 2 weeks. No emergent  surgical intervention indicated. Ok to proceed with surgery for left hip fracture. If needed ok to start Lovenox post-op for DVT prophylaxis     The patient's symptoms, exam, testing and plan of care have been discussed with Dr. Annabel Kim. Will sign off. Thank you again for this consultation.     YARI Armendariz - CNP  1/25/2022

## 2022-01-25 NOTE — PROGRESS NOTES
Patient fell at home, admitted for fracture of left hip. Patient is alert but only respond to voices. Patient has a history of demential, agitated sometimes, holding staff hands when providing care. Desat into 80s while asleep, O2 2L per NC initiated, patient is removing the nasal cannula. Ice pack and morphine X1 for pain. NPO, and blood cath in place. Fall precautions in place, bed in lowest position and call light within the reach. Patient will not respond to any questions during the admission and there is no family at bedside.

## 2022-01-25 NOTE — PROGRESS NOTES
Vitals recording moved to 1 hr, to decrease Pt agitation. Pt observed attempting to bite RN staff, clawing staff, and attempting to pull out IV. IV and BP cuff secured with coban. Daughter at bedside.

## 2022-01-25 NOTE — PROGRESS NOTES
Surgery consent signed and on chart. Daughter at bedside. Talked to Narendra Guzmán RN in surgery about DNR status.

## 2022-01-25 NOTE — PROGRESS NOTES
Pt admitted to PACU, not yet awakening to verbal commands, L hip drsng CD&I, + pedal pulses, RR easy and regular, vss O2 saturations stable on simple mask 5L.

## 2022-01-25 NOTE — ANESTHESIA POSTPROCEDURE EVALUATION
Department of Anesthesiology  Postprocedure Note    Patient: Shaneka Hartmann  MRN: [id number]  YOB: 1928  Date of evaluation: 1/25/2022  Time:  5:09 PM     Procedure Summary     Date: 01/25/22 Room / Location: [address]    Anesthesia Start: 5656 Anesthesia Stop: 5737    Procedure: OPEN REDUCTION INTERNAL FIXATION OF INTERTROCHANTERIC FRACTURE OF LEFT HIP USING TROCHANTERIC FIXATION NAIL (Left Hip) Diagnosis: (Intertrochanteric Fracture of Left Hip)    Surgeons: Santosh Mayorga MD Responsible Provider: Virginia Astudillo MD    Anesthesia Type: general ASA Status: 4          Anesthesia Type: general    Alison Phase I: Alison Score: 7    Alison Phase II:      Last vitals: Reviewed and per EMR flowsheets.        Anesthesia Post Evaluation    Patient location during evaluation: PACU  Patient participation: complete - patient participated  Level of consciousness: confused  Airway patency: patent  Nausea & Vomiting: no vomiting and no nausea  Complications: no  Cardiovascular status: hemodynamically stable  Respiratory status: acceptable  Hydration status: stable

## 2022-01-25 NOTE — PROGRESS NOTES
Pt resting, opens eyes to verbal commands, unable to follow commands. L hip drsng CD&I, + pedal pulses, H/H sent and resulted-6.1/19. Dr Silvia Carson aware - orders to be placed for transfusion. Vss, weaned to RA.  Phase 1 discharge criteria met

## 2022-01-25 NOTE — CONSULTS
PALLIATIVE MEDICINE CONSULTATION     Patient name:Ivy Roberson   YPO:[id number]    :1928  Room/Bed:[facility]4473/4473-01   LOS: 1 day         Date of consult:2022    Consult Information  Palliative Medicine Consult performed by: YARI Castaneda CNP      Inpatient consult to Palliative Care  Consult performed by: YARI Castaneda CNP  Consult ordered by: Antonio Trinidad MD  Reason for consult: [address] and code status        Number of admissions past 12 months: 1      ASSESSMENT/RECOMMENDATIONS     80 y.o. female with AMS and left leg pain with alzheimers      Symptom Management:  1. AMS- alzheimer's at baseline now with acute subdural hematoma  2. Leg pain- s/p fracture after a fall   3. Goals of Care- pt does not have capacity to make decisions talked to daughter Michelle Sevilla at bedside she reports that pt doesn't want intubation or CPR and is a [facility]. She is pts HCPOA and brought paperwork. We discussed plan for surgery to hip to help with pain and possibly [facility]ospice at [place] with the goal of no additional hospital admissions and extra support for end stage Alzheimers. Daughter requests [facility] as she sees them in the building frequently. Patient/Family Goals of Care :    pt does not have capacity to make decisions talked to daughter Michelle Sevilla at bedside she reports that pt doesn't want intubation or CPR and is a [facility]. She is pts HCPOA and brought paperwork. We discussed plan for surgery to hip to help with pain and possibly [facility]Hospice at DC with the goal of no additional hospital admissions and extra support for end stage Alzheimers. Daughter requests [facility] as she sees them in the building frequently. Disposition/Discharge Plan:   pending    Advance Directives:  · Surrogate Decision Maker: Raisa-daughter  · Code status:  DNR-CC    Case discussed with: patient, floor RN  Thank you for allowing us to participate in the care of this patient.       HISTORY     CC: left leg pain  HPI: The patient is a 80 y.o. female presents to the ED complaining of fall with left hip pain. Patient has severe dementia and so history is somewhat limited from her. No other apparent injuries noted acutely. This happened at her [facility] care at around 5:00 in the evening. She is here with her daughter who helps supplement the history and is POA. CT of head with subarachnoid hemorrhage    Palliative Medicine SymptomScreening/ROS:  Review of Systems -   History obtained from chart review and unobtainable from patient due to mental status     Patient unable to complete full ROS due to current cognitive status. Information that is obtained from nursing and chart.      Pain:    Home med list and hospital medications reviewed in chart as of 1/25/2022     EXAM     Vitals:    01/25/22 0845   BP: (!) 149/70   Pulse: 115   Resp: 16   Temp: 97.5 °F (36.4 °C)   SpO2: 100%       Physical Examination:   General appearance - chronically ill appearing  Mental status - confused  Neck - supple, no significant adenopathy  Chest - clear to auscultation, no wheezes, rales or rhonchi, symmetric air entry  Abdomen - soft, nontender, nondistended, no masses or organomegaly  Musculoskeletal - frail with decreased ROM and pain with palpation of left leg  Skin - normal coloration and turgor, no rashes, no suspicious skin lesions noted        Current labs in the epic chart reviewed as of 1/25/2022   Review of previous notes, admits, labs, radiology and testing relevant to this consult done in this chart today 1/25/2022      Total time: 70 minutes  >50% of time spent counseling patient at bedside or POA/family member if applicable , reviewing information and discussing care, coordinating with care team  Signed By: Electronically signed by YARI Lawson CNP on 1/25/2022 at 1:49 PM  Palliative Medicine   [phone]    January 25, 2022

## 2022-01-25 NOTE — H&P
History and Physical  Dr. Lydia Johnson  1/24/2022    PCP: Ramez Arreola MD    Cc:   Chief Complaint   Patient presents with   Wendy Dominguezis     Came in from [facility] from [facility] from s/p fall with left hip pain. Hx of dementia. HPI:  Jose Brown is a 80 y.o. female who has a past medical history of Disorder of bone and cartilage, unspecified, Esophagitis, unspecified, Gout, unspecified, Hx of colonic polyps, Hyperthyroidism, Memory loss, Osteoarthrosis, unspecified whether generalized or localized, unspecified site, Pedal edema, and Unspecified essential hypertension. Patient presents with Closed left hip fracture ([address] HPI  (1-3 for expanded problem focused, ?4 for detailed/comprehensive)      80 y.o. female who presents by EMS from [facility]. Approximately 5 PM this evening patient apparently fell on left hip with complaint of pain and deformity. Hip appears rotated inward and shortened. She has no other related complaints. Patient with known Alzheimer dementia. Hip film reviewed   Impression:       Acute left femoral intertrochanteric fracture. CT Head also done   Impression:       Question minimal right medial temporal lobe subarachnoid hemorrhage. Consider short-term follow-up head CT. Neurosurgery recommends short term followup ct  Pt is not transferred to [facility], [facility]. as family does not want aggressive intervention of this    Problem list of hospitalization thus far:   Active Hospital Problems    Diagnosis     Subarachnoid hemorrhage following injury ([facility] [S06.6X9A]     Closed left hip fracture ([facility] [S72.002A]     UTI (urinary tract infection) [N39.0]     Anemia [D64.9]     Hip fracture requiring operative repair, left, closed, initial encounter ([facility] [place]     Age-related osteoporosis without current pathological fracture [M81.0]     Essential hypertension [I10]     Osteoarthritis [M19.90]          Review of Systems: (1 system for EPF, 2-9 for detailed, 10+ for comprehensive)    Cannot obtain from pt    Past Medical History:   Past Medical History:   Diagnosis Date    Disorder of bone and cartilage, unspecified     Esophagitis, unspecified     Gout, unspecified     Hx of colonic polyps     Hyperthyroidism     Memory loss     Osteoarthrosis, unspecified whether generalized or localized, unspecified site     Pedal edema     Unspecified essential hypertension        Past Surgical History:   Past Surgical History:   Procedure Laterality Date    ANGIOPLASTY Bilateral     -renal art stenosis,        Social History:   Social History     Tobacco History     Smoking Status  Never Smoker    Smokeless Tobacco Use  Never Used          Alcohol History     Alcohol Use Status  No          Drug Use     Drug Use Status  No          Sexual Activity     Sexually Active  Not Asked                Fam History: History reviewed. No pertinent family history. PFSH: The above PMHx, PSHx, SocHx, FamHx has been reviewed by myself. (1 area for detailed, 2-3 for comprehensive)      Code Status: No Order    Meds  following list of home medications fromelectronic chart has been reviewed by myself  Prior to Admission medications    Medication Sig Start Date End Date Taking?  Authorizing Provider   sucralfate (CARAFATE) 1 GM tablet TAKE ONE TABLET BY MOUTH FOUR TIMES A DAY 2/12/19   Michelle Lopez MD   donepezil (ARICEPT) 5 MG tablet Take 5 mg by mouth nightly    Historical Provider, MD   melatonin 3 MG TABS tablet Take 3 mg by mouth nightly as needed    Historical Provider, MD   QUEtiapine (SEROQUEL) 25 MG tablet Take 1 tablet by mouth nightly  Patient taking differently: Take 50 mg by mouth nightly  1/22/19   Michelle Lopez MD   ASPIRIN 81 PO Take by mouth    Historical Provider, MD   cloNIDine (CATAPRES) 0.2 MG tablet TAKE 1 TABLET TWICE A DAY 10/2/18   Michelle Lopez MD   TOPROL XL 25 MG extended release tablet TAKE 1 TABLET DAILY 10/2/18   Michelle Lopez MD   allopurinol (ZYLOPRIM) 100 MG tablet TAKE ONE TABLET BY MOUTH TWICE DAILY 10/2/18   Mahnaz Palomo MD   DIOVAN -25 MG per tablet TAKE 1 TABLET DAILY 10/2/18   Mahnaz Palomo MD   omeprazole (PRILOSEC) 20 MG delayed release capsule TAKE ONE CAPSULE BY MOUTH DAILY 1/30/18   Mahnaz Palomo MD   Dermatological Products, [facility]) EMUL Apply externally once daily 1/5/18   Mahnaz Palomo MD         Allergies   Allergen Reactions    Actonel [Risedronate Sodium] Other (See Comments)     Gastrointestinal upset.  Alendronate Sodium Other (See Comments)     Gastrointestinal upset    Altace [Ramipril]     Boniva [Ibandronic Acid]     Centrax [Prazepam]     Climara [Estradiol]     Doxycycline Calcium [Doxycycline Calcium]     Estrogenic Substance     Metoprolol Other (See Comments)     Severe se from generic.  Mobic [Meloxicam] Other (See Comments)     Fluid.  Pcn [Penicillins]     Sulfa Antibiotics     Triamterene-Hctz     Vioxx [Rofecoxib] Other (See Comments)     Dermatitis.      Dyazide [Hydrochlorothiazide W-Triamterene] Rash             EXAM: (2-7 system for EPF/Detailed, ?8 for Comprehensive)  BP (!) 128/52   Pulse 94   Temp 98.2 °F (36.8 °C) (Axillary)   Resp 13   Ht 4' 10\" (1.473 m)   Wt 110 lb (49.9 kg)   SpO2 100%   BMI 22.99 kg/m²   Constitutional: vitals as above: appears stated age   Head: Normocephalic, without obvious abnormality, atraumatic    Eyes:lids and lashes normal, conjunctivae and sclerae normal and pupils equal, round, reactive to light and accomodation    EMNT: external ears normal, nares midline    Neck: no carotid bruit, supple, symmetrical, trachea midline and thyroid not enlarged, symmetric, no tenderness/mass/nodules     Respiratory: clear to auscultation and percussion bilaterally with normal respiratory effort    Cardiovascular: normal rate, regular rhythm, normal S1 and S2 and no murmurs    Gastrointestinal: soft, non-tender, non-distended, normal bowel sounds, no masses or organomegaly    Extremities: no clubbing, no edema , leg rotated  Skin:No rashes or nodules noted.     Neurologic:negative         LABS:  Labs Reviewed   CBC WITH AUTO DIFFERENTIAL - Abnormal; Notable for the following components:       Result Value    WBC 14.8 (*)     Hemoglobin 11.6 (*)     Hematocrit 35.8 (*)     Neutrophils Absolute 13.6 (*)     Lymphocytes Absolute 0.9 (*)     All other components within normal limits    Narrative:     Performed at:  [facility] [address]   Phone [phone]   COMPREHENSIVE METABOLIC PANEL W/ REFLEX TO MG FOR LOW K - Abnormal; Notable for the following components:    Glucose 151 (*)     BUN 33 (*)     CREATININE 1.3 (*)     GFR Non- 38 (*)     GFR  46 (*)     All other components within normal limits    Narrative:     Performed at:  [facility] [address] [place], [place]   Phone [phone]   URINE RT REFLEX TO CULTURE - Abnormal; Notable for the following components:    Clarity, UA CLOUDY (*)     Blood, Urine LARGE (*)     Protein, UA 30 (*)     Leukocyte Esterase, Urine SMALL (*)     All other components within normal limits    Narrative:     Performed at:  [facility] [address] [place]   Phone [phone]   MICROSCOPIC URINALYSIS - Abnormal; Notable for the following components:    WBC, UA 12 (*)     RBC,  (*)     All other components within normal limits    Narrative:     Performed at:  [facility] [address], [place]   Phone [phone], RAPID    Narrative:     Performed at:  [facility] [address],  [place]   Phone [phone]   CULTURE, URINE   TROPONIN    Narrative:     Performed at:  [facility]  [address], [address]   Phone [phone]   BRAIN NATRIURETIC PEPTIDE    Narrative:     Performed at:  [facility]  [address], [address]   Phone [phone]   PROTIME-INR    Narrative:     Performed at:  [facility]  [address] [facility]  [place], [address]   Phone [phone]   APTT    Narrative:     Performed at:  [facility]  [address]   Phone [phone]         IMAGING:  Imaging results from the ER have been reviewed in the computerized chart. XR CHEST PORTABLE    Result Date: 1/24/2022  EXAMINATION: ONE XRAY VIEW OF THE CHEST 1/24/2022 7:07 pm COMPARISON: 06/03/2017 HISTORY: ORDERING SYSTEM PROVIDED HISTORY: Suspect hip fracture, postop probability TECHNOLOGIST PROVIDED HISTORY: Reason for exam:->Suspect hip fracture, postop probability FINDINGS: The cardiac silhouette, mediastinal and hilar contours are normal.  The lungs are clear. There are no pleural effusions. No acute osseous abnormalities are identified. No acute cardiopulmonary disease. XR HIP 2-3 VW W PELVIS LEFT    Result Date: 1/24/2022  EXAMINATION: ONE XRAY VIEW OF THE PELVIS AND TWO XRAY VIEWS LEFT HIP 1/24/2022 7:07 pm COMPARISON: None. HISTORY: ORDERING SYSTEM PROVIDED HISTORY: Fall with left hip pain and deformity TECHNOLOGIST PROVIDED HISTORY: Reason for exam:->Fall with left hip pain and deformity FINDINGS: There is an acute closed traumatic completely displaced left proximal femoral intertrochanteric fracture with varus angulation of the fragments. Medially the fracture extends into the subtrochanteric region. The bones are osteopenic. Femoroacetabular alignment is maintained. Acute left femoral intertrochanteric fracture.          EKG:   EKG from ER, reviewed by self  it shows sinus tach at 80  Old chart reviewed, EKG dated 6/3/17 is reviewed, there is  difference noted. Old study shows sinus at 76    Lab Results   Component Value Date    GLUCOSE 151 01/24/2022     No results found for: POCGLU  BP (!) 128/52   Pulse 94   Temp 98.2 °F (36.8 °C) (Axillary)   Resp 13   Ht 4' 10\" (1.473 m)   Wt 110 lb (49.9 kg)   SpO2 100%   BMI 22.99 kg/m²     MEDICAL DECISION MAKING:    Principal Problem:    Closed left hip fracture (HCC) -New Problem to me. Pt with fall, hip fx  Plan: admit, iv pain meds. Consult ortho. Active Problems:    Essential hypertension -Established problem. Stable. 128/52  Plan: Continue present orders/plan. Subarachnoid hemorrhage - New Problem to me. Poss SAH  Plan: repeat CT ordered by N-surg    Age-related osteoporosis without current pathological fracture    UTI (urinary tract infection)  Plan: cont iv abx await cx    Anemia -Established problem. Stable. Plan: No indication for transfusion. Cont to monitor h/h to assess progression of anemia. Recommend ferrous sulfate or MVI as outpatient. Diagnoses as listed above, designated as new or established and plan outlined for each. Data Reviewed:   (1) Lab tests were reviewed or ordered. (1) Radiology tests were reviewed or ordered. (1) Medical test (Echo, EKG, PFT/yashira) were ordered. (1)History was not obtained from someone other than patient  (1) Old records were reviewed - see HPI/MDM for pertinent details if review done. (2) Case was discussed with another health care provider: Dr Gunner Michelle  (2) Imaging was viewed by myself. (2) EKG  was viewed by myself. The patient is being placed in inpatient status with the expectation of requiring a hospital stay spanning at least two midnights for care and treatment of the problems noted in the problem list.  This determination is also based on thepatients comorbidities and past medical history, the severity and timing of the signs and symptoms upon presentation.     (Please note that portions of this note were completed with a voice recognition program.  Efforts were made to edit the dictations but occasionally words are mis-transcribed.)      Electronically signed by: Bianca Bell MD 1/24/2022

## 2022-01-25 NOTE — DISCHARGE INSTR - COC
Continuity of Care Form    Patient Name: Jose Brown   :  1928  MRN:  [id number]    [address] [place] [address] date:  2022  Discharge date:  22    Code Status Order: Full Code   Advance Directives:      Admitting Physician:  Taya Maldonado MD  PCP: Ramez Arreola MD    Discharging Nurse: ALEKSANDAR [facility] Unit/Room#: 2UV-8290/9402-43  Discharging Unit Phone Number: [phone]    Emergency Contact:   Extended Emergency Contact Information  Primary Emergency Contact: Raisa Travis   [address] Phone: [phone]  Mobile Phone: [phone]  Relation: Child  Secondary Emergency Contact: [address] Phone: [phone]  Relation: Brother/Sister    Past Surgical History:  Past Surgical History:   Procedure Laterality Date    ANGIOPLASTY Bilateral     -renal art stenosis,        Immunization History:   Immunization History   Administered Date(s) Administered    Influenza Vaccine, unspecified formulation 2016    Influenza Virus Vaccine 10/05/2010, 2011, 10/05/2012    Influenza Whole 10/15/2015    Influenza, High Dose (Fluzone 65 yrs and older) 2013, 10/23/2014, 2017, 10/02/2018    Pneumococcal Conjugate 13-valent (Ferol Fore) 2015    Pneumococcal Conjugate 7-valent (Lylia Hobby) 2011    Zoster Live (Zostavax) 12/10/2007       Active Problems:  Patient Active Problem List   Diagnosis Code    Gout M10.9    Essential hypertension I10    Osteoarthritis M19.90    Memory loss R41.3    Hx of colonic polyps Z86.010    Edema of left lower extremity R60.0    Age-related osteoporosis without current pathological fracture M81.0    Closed left hip fracture ([facility] S72.002A    UTI (urinary tract infection) N39.0    Anemia D64.9    Hip fracture requiring operative repair, left, closed, initial encounter ([address] S72.002A    Subarachnoid hemorrhage following injury ([facility] [address] D14.6Q7E       Isolation/Infection:   Isolation            No Isolation          Patient Infection Status       None to display            Nurse Assessment:  Last Vital Signs: BP (!) 149/70   Pulse 115   Temp 97.5 °F (36.4 °C) (Axillary)   Resp 16   Ht 4' 10\" (1.473 m)   Wt 110 lb (49.9 kg)   SpO2 100%   BMI 22.99 kg/m²     Last documented pain score (0-10 scale): Pain Level: 8  Last Weight:   Wt Readings from Last 1 Encounters:   01/24/22 110 lb (49.9 kg)     Mental Status:  disoriented    IV Access:  - None    Nursing Mobility/ADLs:  Walking   Dependent  Transfer  Dependent  Bathing  Dependent  Dressing  Dependent  Toileting  Dependent  Feeding  Dependent  Med Admin  Dependent  Med Delivery   crushed    Wound Care Documentation and Therapy:        Elimination:  Continence: Bowel: Yes  Bladder: Yes  Urinary Catheter: Insertion Date: 1/25    Colostomy/Ileostomy/Ileal Conduit: No       Date of Last BM:     Intake/Output Summary (Last 24 hours) at 1/25/2022 9325  Last data filed at 1/25/2022 0115  Gross per 24 hour   Intake --   Output 450 ml   Net -450 ml     I/O last 3 completed shifts:  In: -   Out: 450 [Urine:450]    Safety Concerns:     History of Falls (last 30 days) and At Risk for Falls    Impairments/Disabilities:      None    Nutrition Therapy:  Current Nutrition Therapy:   - Oral Diet:  General    Routes of Feeding: Oral  Liquids: Thin Liquids  Daily Fluid Restriction: no  Last Modified Barium Swallow with Video (Video Swallowing Test): not done    Treatments at the Time of Hospital Discharge:   Respiratory Treatments: ra  Oxygen Therapy:  is not on home oxygen therapy. Ventilator:    - No ventilator support    Rehab Therapies: Physical Therapy  Weight Bearing Status/Restrictions: as susannah  Other Medical Equipment (for information only, NOT a DME order):  wheelchair and hospital bed  Other Treatments: Mepilex dressing x 7 days; then remove if no drainage. Remove staples POD #10-14 and replace with steri strips. Follow up with Dr. Kavon Kennedy 4 weeks post op.    Call [phone] for appt.  Anticoagulation:  Resume home ASA 81mg daily on 2/8/22    You have demonstrated risk factors for osteoporosis, such as age greater than [de-identified] years and evidence of a fracture. Please see your primary care physician for evaluation for osteoporosis, including consideration for DEXA scanning, if this is felt to be clinically indicated. Patient's personal belongings (please select all that are sent with patient):  None    RN SIGNATURE:  Electronically signed by Leon Rivera RN on 1/27/22 at 10:33 AM EST    CASE MANAGEMENT/SOCIAL WORK SECTION    Inpatient Status Date: ***    Readmission Risk Assessment Score:  Readmission Risk              Risk of Unplanned Readmission:  17           Discharging to Facility/ Agency   Name:   Address:  Phone:  Fax:    Dialysis Facility (if applicable)   Name:  Address:  Dialysis Schedule:  Phone:  Fax:    / signature: {Esignature:[id number]}    PHYSICIAN SECTION    Prognosis: Guarded    Condition at Discharge: Stable    Rehab Potential (if transferring to Rehab): Good    Recommended Labs or Other Treatments After Discharge: family to enroll with \[facility]\"" hospice    Physician Certification: I certify the above information and transfer of Geetha Arshad  is necessary for the continuing treatment of the diagnosis listed and that she requires [facility] for greater 30 days.      Update Admission H&P: No change in H&P    PHYSICIAN SIGNATURE:  Electronically signed by Antonio Lomeli MD on 1/27/22 at 11:08 AM EST

## 2022-01-25 NOTE — PROGRESS NOTES
Progress Note - Dr. Nini Alberts - Internal Medicine  PCP: Andrew Vergara  [facility] / [place] [address] [facility] Day: 1  Code Status: Full Code  Current Diet: Diet NPO        CC: follow up on medical issues    Subjective:   Norbert Rosado is a 80 y.o. female. Pt does not offer hx nor ros    Repeat ct head reviewed   Impression:       Stable small focal hyperdensity seen along the medial aspect of the right   temporal lobe felt related to subarachnoid hemorrhage.  No new intracranial   hemorrhage or interval worsening. Neurosurg consulted     I have reviewed the patient's medical and social history in detail and updated the computerized patient record. To recap: She  has a past medical history of Disorder of bone and cartilage, unspecified, Esophagitis, unspecified, Gout, unspecified, Hx of colonic polyps, Hyperthyroidism, Memory loss, Osteoarthrosis, unspecified whether generalized or localized, unspecified site, Pedal edema, and Unspecified essential hypertension. . She  has a past surgical history that includes angioplasty (Bilateral). . She  reports that she has never smoked. She has never used smokeless tobacco. She reports that she does not drink alcohol and does not use drugs. .        Active Hospital Problems    Diagnosis Date Noted    Subarachnoid hemorrhage following injury ([facility] [S06.6X9A] 01/25/2022    Closed left hip fracture ([facility] 01/24/2022    UTI (urinary tract infection) [N39.0] 01/24/2022    Anemia [D64.9] 01/24/2022    Hip fracture requiring operative repair, left, closed, initial encounter ([facility] 01/24/2022    Age-related osteoporosis without current pathological fracture [M81.0] 04/17/2018    Essential hypertension [I10]     Osteoarthritis [M19.90]        Current Facility-Administered Medications: pantoprazole (PROTONIX) tablet 40 mg, 40 mg, Oral, QAM AC  cloNIDine (CATAPRES) tablet 0.1 mg, 0.1 mg, Oral, BID  metoprolol succinate (TOPROL XL) extended release tablet 25 mg, 25 mg, Oral, Daily  allopurinol (ZYLOPRIM) tablet 100 mg, 100 mg, Oral, BID  QUEtiapine (SEROQUEL) tablet 50 mg, 50 mg, Oral, Nightly  donepezil (ARICEPT) tablet 5 mg, 5 mg, Oral, Nightly  melatonin tablet 3 mg, 3 mg, Oral, Nightly PRN  sucralfate (CARAFATE) tablet 1 g, 1 g, Oral, 4 times per day  0.9 % sodium chloride infusion, , IntraVENous, Continuous  sodium chloride flush 0.9 % injection 5-40 mL, 5-40 mL, IntraVENous, 2 times per day  sodium chloride flush 0.9 % injection 10 mL, 10 mL, IntraVENous, PRN  0.9 % sodium chloride infusion, 25 mL, IntraVENous, PRN  oxyCODONE (ROXICODONE) immediate release tablet 5 mg, 5 mg, Oral, Q4H PRN **OR** oxyCODONE (ROXICODONE) immediate release tablet 10 mg, 10 mg, Oral, Q4H PRN  morphine (PF) injection 2 mg, 2 mg, IntraVENous, Q2H PRN **OR** morphine injection 4 mg, 4 mg, IntraVENous, Q2H PRN  ondansetron (ZOFRAN-ODT) disintegrating tablet 4 mg, 4 mg, Oral, Q8H PRN **OR** ondansetron (ZOFRAN) injection 4 mg, 4 mg, IntraVENous, Q6H PRN  magnesium hydroxide (MILK OF MAGNESIA) 400 MG/5ML suspension 30 mL, 30 mL, Oral, Daily PRN  potassium chloride (KLOR-CON M) extended release tablet 40 mEq, 40 mEq, Oral, PRN **OR** potassium bicarb-citric acid (EFFER-K) effervescent tablet 40 mEq, 40 mEq, Oral, PRN **OR** potassium chloride 10 mEq/100 mL IVPB (Peripheral Line), 10 mEq, IntraVENous, PRN  cefTRIAXone (ROCEPHIN) 1000 mg in sterile water 10 mL IV syringe, 1,000 mg, IntraVENous, Q24H  hydrALAZINE (APRESOLINE) injection 10 mg, 10 mg, IntraVENous, Q6H PRN  0.9 % sodium chloride bolus, 500 mL, IntraVENous, PRN  valsartan (DIOVAN) tablet 160 mg, 160 mg, Oral, Daily **AND** hydroCHLOROthiazide (HYDRODIURIL) tablet 25 mg, 25 mg, Oral, Daily  0.9 % sodium chloride infusion, 1,000 mL, IntraVENous, Continuous         Objective:  BP (!) 153/89   Pulse 103   Temp 97.8 °F (36.6 °C) (Axillary)   Resp 18   Ht 4' 10\" (1.473 m)   Wt 110 lb (49.9 kg) SpO2 99%   BMI 22.99 kg/m²      Patient Vitals for the past 24 hrs:   BP Temp Temp src Pulse Resp SpO2 Height Weight   01/25/22 0400 (!) 153/89 97.8 °F (36.6 °C) Axillary 103 18 99 %     01/25/22 0145 (!) 145/84 97.7 °F (36.5 °C) Axillary 101 16 100 %     01/25/22 0115 (!) 147/61 97.7 °F (36.5 °C) Axillary 108 16 100 %     01/25/22 0000 (!) 133/59   110 14 96 %     01/24/22 2345 (!) 131/59   109 14 96 %     01/24/22 2315 (!) 140/70   109 15 100 %     01/24/22 2300 (!) 144/72   111 19 99 %     01/24/22 2200 (!) 128/52   94 13 100 %     01/24/22 2100 (!) 145/63   96 16 96 %     01/24/22 2046    98       01/24/22 2030 (!) 129/112   106 17 93 %     01/24/22 2000 (!) 161/115   117 30 97 %     01/24/22 1801 (!) 152/81 98.2 °F (36.8 °C) Axillary 96 20 96 % 4' 10\" (1.473 m) 110 lb (49.9 kg)     Patient Vitals for the past 96 hrs (Last 3 readings):   Weight   01/24/22 1801 110 lb (49.9 kg)           Intake/Output Summary (Last 24 hours) at 1/25/2022 0835  Last data filed at 1/25/2022 0115  Gross per 24 hour   Intake    Output 450 ml   Net -450 ml         Physical Exam:   BP (!) 153/89   Pulse 103   Temp 97.8 °F (36.6 °C) (Axillary)   Resp 18   Ht 4' 10\" (1.473 m)   Wt 110 lb (49.9 kg)   SpO2 99%   BMI 22.99 kg/m²   General appearance: appears comfortable  Head: Normocephalic, without obvious abnormality, atraumatic  Lungs: clear to auscultation bilaterally  Heart: tachy, nl s1 s2  Abdomen: soft, non-tender; bowel sounds normal; no masses,  no organomegaly  Extremities: L leg rotated    Labs:  Lab Results   Component Value Date    WBC 10.4 01/25/2022    HGB 8.8 (L) 01/25/2022    HCT 26.8 (L) 01/25/2022     01/25/2022    CHOL 195 11/26/2016    TRIG 149 11/26/2016    HDL 57 11/26/2016    ALT 13 01/25/2022    AST 20 01/25/2022     01/25/2022    K 4.8 01/25/2022     01/25/2022    CREATININE 1.2 01/25/2022    BUN 33 (H) 01/25/2022    CO2 24 01/25/2022 WITHOUT CONTRAST 1/24/2022 10:28 pm TECHNIQUE: CT of the head was performed without the administration of intravenous contrast. Dose modulation, iterative reconstruction, and/or weight based adjustment of the mA/kV was utilized to reduce the radiation dose to as low as reasonably achievable.; CT of the cervical spine was performed without the administration of intravenous contrast. Multiplanar reformatted images are provided for review. Dose modulation, iterative reconstruction, and/or weight based adjustment of the mA/kV was utilized to reduce the radiation dose to as low as reasonably achievable. COMPARISON: None. HISTORY: ORDERING SYSTEM PROVIDED HISTORY: Fall TECHNOLOGIST PROVIDED HISTORY: Reason for exam:->Fall Has a \"code stroke\" or \"stroke alert\" been called? ->No Decision Support Exception - unselect if not a suspected or confirmed emergency medical condition->Emergency Medical Condition (MA) Reason for Exam: fall Relevant Medical/Surgical History: Fall (Came in from [facility] from [facility] from s/p fall with left hip pain. Hx of dementia. ) FINDINGS: BRAIN/VENTRICLES: Motion degradation. There is minimal extra-axial hyperdensity identified along the right medial temporal lobe along the the lateral margin of the anterior leaflet worrisome for minimal subarachnoid blood products. There is no acute intracranial hemorrhage, mass effect or midline shift. No abnormal extra-axial fluid collection. The gray-white differentiation is maintained without evidence of an acute infarct. There is no evidence of hydrocephalus. Mild involutional change and chronic microvascular disease. Vascular calcifications. ORBITS: The visualized portion of the orbits demonstrate no acute abnormality. SINUSES: Right mastoid effusion. No coalescence identified. Mild ethmoid sinus mucosal thickening. SOFT TISSUES/SKULL:  No acute abnormality of the visualized skull or soft tissues.  CT cervical spine demonstrates no evidence acute fracture traumatic malalignment. Vertebral heights are maintained. Moderate multilevel degenerate changes most advanced C5 through C7. Moderate severe multilevel degenerate facet arthropathy. No prevertebral soft tissue swelling. Question minimal right medial temporal lobe subarachnoid hemorrhage. Consider short-term follow-up head CT. Multilevel degenerate changes Cervical spine without acute fracture traumatic malalignment. Critical results were called by Dr. Ernestina Haley to Dr Vinicio Friend on 1/24/2022 at 23:23. CT Cervical Spine WO Contrast    Result Date: 1/24/2022  EXAMINATION: CT OF THE HEAD WITHOUT CONTRAST; CT OF THE CERVICAL SPINE WITHOUT CONTRAST 1/24/2022 10:28 pm TECHNIQUE: CT of the head was performed without the administration of intravenous contrast. Dose modulation, iterative reconstruction, and/or weight based adjustment of the mA/kV was utilized to reduce the radiation dose to as low as reasonably achievable.; CT of the cervical spine was performed without the administration of intravenous contrast. Multiplanar reformatted images are provided for review. Dose modulation, iterative reconstruction, and/or weight based adjustment of the mA/kV was utilized to reduce the radiation dose to as low as reasonably achievable. COMPARISON: None. HISTORY: ORDERING SYSTEM PROVIDED HISTORY: Fall TECHNOLOGIST PROVIDED HISTORY: Reason for exam:->Fall Has a \"code stroke\" or \"stroke alert\" been called? ->No Decision Support Exception - unselect if not a suspected or confirmed emergency medical condition->Emergency Medical Condition (MA) Reason for Exam: fall Relevant Medical/Surgical History: Fall (Came in from [facility] from [facility] from s/p fall with left hip pain. Hx of dementia. ) FINDINGS: BRAIN/VENTRICLES: Motion degradation.   There is minimal extra-axial hyperdensity identified along the right medial temporal lobe along the the lateral margin of the anterior leaflet worrisome for minimal subarachnoid blood products. There is no acute intracranial hemorrhage, mass effect or midline shift. No abnormal extra-axial fluid collection. The gray-white differentiation is maintained without evidence of an acute infarct. There is no evidence of hydrocephalus. Mild involutional change and chronic microvascular disease. Vascular calcifications. ORBITS: The visualized portion of the orbits demonstrate no acute abnormality. SINUSES: Right mastoid effusion. No coalescence identified. Mild ethmoid sinus mucosal thickening. SOFT TISSUES/SKULL:  No acute abnormality of the visualized skull or soft tissues. CT cervical spine demonstrates no evidence acute fracture traumatic malalignment. Vertebral heights are maintained. Moderate multilevel degenerate changes most advanced C5 through C7. Moderate severe multilevel degenerate facet arthropathy. No prevertebral soft tissue swelling. Question minimal right medial temporal lobe subarachnoid hemorrhage. Consider short-term follow-up head CT. Multilevel degenerate changes Cervical spine without acute fracture traumatic malalignment. Critical results were called by Dr. Kwabena Carvalho to Dr Angie Light on 1/24/2022 at 23:23. XR CHEST PORTABLE    Result Date: 1/24/2022  EXAMINATION: ONE XRAY VIEW OF THE CHEST 1/24/2022 7:07 pm COMPARISON: 06/03/2017 HISTORY: ORDERING SYSTEM PROVIDED HISTORY: Suspect hip fracture, postop probability TECHNOLOGIST PROVIDED HISTORY: Reason for exam:->Suspect hip fracture, postop probability FINDINGS: The cardiac silhouette, mediastinal and hilar contours are normal.  The lungs are clear. There are no pleural effusions. No acute osseous abnormalities are identified. No acute cardiopulmonary disease.      CT HIP LEFT WO CONTRAST    Result Date: 1/25/2022  EXAMINATION: CT OF THE LEFT HIP WITHOUT CONTRAST 1/24/2022 10:28 pm TECHNIQUE: CT of the left hip was performed without the administration of intravenous contrast.  Multiplanar reformatted images are provided for review. Dose modulation, iterative reconstruction, and/or weight based adjustment of the mA/kV was utilized to reduce the radiation dose to as low as reasonably achievable. COMPARISON: None. HISTORY ORDERING SYSTEM PROVIDED HISTORY: History fall 5 PM this evening, x-ray shows fracture TECHNOLOGIST PROVIDED HISTORY: Please evaluate pelvis with the CT scan. Reason for exam:->History fall 5 PM this evening, x-ray shows fracture Decision Support Exception - unselect if not a suspected or confirmed emergency medical condition->Emergency Medical Condition (MA) Reason for Exam: History fall 5 PM this evening, x-ray shows fracture Relevant Medical/Surgical History: Fall (Came in from [facility] from [facility] from s/p fall with left hip pain. Hx of dementia. ) FINDINGS: Bones: The bones are osteopenic. Acute comminuted and angulated intertrochanteric/subtrochanteric fracture of the left femur is noted. There is no lytic or sclerotic lesion. No osseous erosion is seen. Soft Tissue: Ill-defined subcutaneous bruising/hematoma is seen on the lateral aspect of the left hip. Vascular calcification is noted. There is distal colonic diverticulosis. Large amount of retained stool is seen in the colon and rectum. A Mckeon catheter is noted in the bladder. Joint: Moderate degenerative changes of the bilateral hip joints are seen. There is small left hip joint hemarthrosis. Acute intertrochanteric/subtrochanteric fracture of the left femur. XR HIP 2-3 VW W PELVIS LEFT    Result Date: 1/24/2022  EXAMINATION: ONE XRAY VIEW OF THE PELVIS AND TWO XRAY VIEWS LEFT HIP 1/24/2022 7:07 pm COMPARISON: None.  HISTORY: ORDERING SYSTEM PROVIDED HISTORY: Fall with left hip pain and deformity TECHNOLOGIST PROVIDED HISTORY: Reason for exam:->Fall with left hip pain and deformity FINDINGS: There is an acute closed traumatic completely displaced left proximal femoral intertrochanteric fracture with varus angulation of the fragments. Medially the fracture extends into the subtrochanteric region. The bones are osteopenic. Femoroacetabular alignment is maintained. Acute left femoral intertrochanteric fracture. Lab Results   Component Value Date    GLUCOSE 157 01/25/2022     No results found for: POCGLU    Assessment and Plan:  Principal Problem:    Closed left hip fracture ([facility] -Established problem. Stable. Plan: ortho to see. Family wants repair of this done; however poss SAH  Active Problems:    Essential hypertension -Established problem. elevated 153/89  Plan: Continue present orders/plan. Osteoarthritis -Established problem. Stable. Plan: Continue present orders/plan. Age-related osteoporosis without current pathological fracture    UTI (urinary tract infection) -Established problem. Stable. Plan: cont iv abx    Anemia -Established problem. Stable.  hgb 8.8  Plan: No indication for transfusion. Cont to monitor h/h to assess progression of anemia. Recommend ferrous sulfate or MVI as outpatient. Hip fracture requiring operative repair, left, closed, initial encounter ([facility]    Subarachnoid hemorrhage following injury ([facility] -Established problem. Stable. Plan: repeat ct shows stable SAH.  neurosurg consulted            Maximino Araiza MD  1/25/2022

## 2022-01-25 NOTE — PROGRESS NOTES
Physical/Occupational Therapy  Alexis Pong  PT/OT orders noted and appreciated. Per chart review, pt admitted with s/p fall at [facility]F with acute intertrochanteric/subtrochanteric fracture of L femur and ortho consult pending. Pt currently with strict bedrest orders placed early this morning. As such, PT/OT will HOLD at this time, pending ortho consult and updated POC for femur fracture.   Thank you,  Vijay Silverio, PT, DPT, [id number]  Red Cannon, OTR/L -- [id number]

## 2022-01-25 NOTE — ED NOTES
Bed: 06  Expected date:   Expected time:   Means of arrival:   Comments:  Laurie Lopez [facility]  01/24/22 5709

## 2022-01-25 NOTE — OP NOTE
Operative Note      Patient: Alexis De Anda  YOB: 1928  MRN: [id number]    Date of Procedure: 1/25/2022    Pre-Op Diagnosis: Intertrochanteric Fracture of Left Hip    Post-Op Diagnosis: Same       Procedure(s):  OPEN REDUCTION INTERNAL FIXATION OF INTERTROCHANTERIC FRACTURE OF LEFT HIP USING TROCHANTERIC FIXATION NAIL #2 biplanar C-arm fluoroscopic evaluation and interpretation    Surgeon(s):  Janina Smith MD    Assistant:   Surgical Assistant: Anabella Kim    Anesthesia: General    Estimated Blood Loss (mL): less than 078     Complications: None    Specimens:   * No specimens in log *    Implants:  * No implants in log *      Drains:   Urethral Catheter Straight-tip 16 fr (Active)   Urine Color Deborah 01/25/22 0857   Urine Appearance Clear 01/25/22 0857   Output (mL) 450 mL 01/25/22 0115       Findings: Moderate comminution at the fracture site. Detailed Description of Procedure:     PATIENT NAME:                     Alexis De Anda  YOB: 1928   MEDICAL RECORD#         [id number]  SURGERY DATE:         1/25/2022  SURGEON:                 Janina Smith MD    PREOPERATIVE DIAGNOSIS: Left intertrochanteric hip fracture. POSTOPERATIVE DIAGNOSIS: Left intertrochanteric hip fracture. PROCEDURE: Left hip femoral nailing. #2 intraoperative C arm fluoroscopic evaluation and interpretation     ANESTHESIA: General anesthesia. INTRAVENOUS FLUIDS: Crystalloid. ESTIMATED BLOOD LOSS: 546TZ      COMPLICATIONS: None. The patient tolerated the quite procedure well. INDICATIONS: The patient is a 80 y.o. female History of injury: fall. The injury occurred at home. The patient was unable to ambulate. Orthopedics was consulted. The patient was found to have an intertrochanteric hip fracture. Due to the nature of her injury, the patient was ultimately cleared and scheduled for trochanteric femoral nailing of the hip.      REPORT: The patient was identified preoperatively. The proper extremity was then marked. The patient was then taken to the operating room and general anesthesia was administered by Anesthesia. The unaffected lower extremity was positioned in the well-leg bruno. The operative extremity was positioned in the traction boot. We then   applied traction and reduced the fracture rather well. Biplanar C-arm confirmed acceptable reduction. We then went ahead and ChloraPrepped the operative hip, pelvis and lower extremity in a standard fashion. Appropriate preoperative antibiotics were administered per SCIP. We then were ready for incision. Using a standard incision just proximal to the greater trochanter, we incised skin and coagulated all bleeders with Bovie electrocautery. We dissected down and split the fascia praveena longitudinally. We did use the large C arm/biplanar fluoroscopy throughout the entire procedure. We then went ahead and seated our Steinmann pin on the tip of the greater   trochanter. We were satisfied with the position on the AP and lateral views. We went ahead and seated our pin and passed this into the medullary canal of   the femur. Again, we were satisfied with the positioning on the AP and   lateral views. We then used our starter drill/ reamer to open up the   medullary canal. We used a intermediate length 11 mm 130 degree Synthes TFN nail. The femoral canal was reamed and prepared in standard fashion. The nail was seated without difficulty. We were then   ready for insertion of the helical blade. We passed our Steinmann pin in a   center-center position into the head on the AP and lateral views. We then   went ahead and depth gauged. We then reamed the outer cortex. We then drilled for the helical blade up into the head. The helical blade was then   inserted and was of appropriate length. We then rotationally locked the   Helical blade. Using the distal aiming arm, an interlocking screw was inserted distally.  This was drilled, depth gauged and inserted in   the appropriate fashion. We then irrigated all layers rather copiously. Again,   using the large C-arm, all hardware was in good position. The fracture was   well reduced. We were then ready for closure. We then closed the fascia   with interrupted figure-of-eight, No. 1 Vicryl stitches. We then closed the   subcutaneous tissues with interrupted 2-0 simple Vicryl stitches. We then   closed the skin with staples. Sterile dressings were applied. There were no   complications.        Electronically signed by Jeffry Raymond MD on 1/25/2022 at 4:33 PM

## 2022-01-25 NOTE — CONSULTS
[facility]  Consult Note    Patient: Shaneka Hartmann  Admit Date: 1/24/2022  Requesting Physician: Irineo Nunez MD  Room: [address] [place], [place] [id number]-    Chief complaint: Left hip pain    HPI: Shaneka Hartmann is a 80 y.o. female who presented to [facility] after she apparently fell at her nursing home, [address] [place] [address]. She resides in a locked dementia unit. She ambulates without assistive devices at baseline per her daughter. She does have advanced dementia and is unable to provide much history herself. Describes pain in the left hip of moderate intensity and of aching nature since the fall yesterday which is relieved by rest and morphine. Denies new numbness/tingling. Imaging review of the left hip via plain films and a CT scan demonstrated: Impacted intertrochanteric hip fracture with subtrochanteric extension. Medical History:  Past Medical History:   Diagnosis Date    Disorder of bone and cartilage, unspecified     Esophagitis, unspecified     Gout, unspecified     Hx of colonic polyps     Hyperthyroidism     Memory loss     Osteoarthrosis, unspecified whether generalized or localized, unspecified site     Pedal edema     Unspecified essential hypertension      Past Surgical History:   Procedure Laterality Date    ANGIOPLASTY Bilateral     -renal art stenosis,        Social History:    reports that she has never smoked. She has never used smokeless tobacco.    Family History:  History reviewed. No pertinent family history.     Medications:  ALL MEDICATIONS HAVE BEEN REVIEWED:  Scheduled:   pantoprazole  40 mg Oral QAM AC    cloNIDine  0.1 mg Oral BID    metoprolol succinate  25 mg Oral Daily    allopurinol  100 mg Oral BID    QUEtiapine  50 mg Oral Nightly    donepezil  5 mg Oral Nightly    sucralfate  1 g Oral 4 times per day    sodium chloride flush  5-40 mL IntraVENous 2 times per day    cefTRIAXone (ROCEPHIN) IV  1,000 mg IntraVENous Q24H    valsartan  160 mg Oral Daily    And    hydroCHLOROthiazide  25 mg Oral Daily     Continuous:   sodium chloride 75 mL/hr at 01/25/22 0416    sodium chloride      sodium chloride Stopped (01/25/22 0416)     PRN:melatonin, sodium chloride flush, sodium chloride, oxyCODONE **OR** oxyCODONE, morphine **OR** morphine, ondansetron **OR** ondansetron, magnesium hydroxide, potassium chloride **OR** potassium alternative oral replacement **OR** potassium chloride, hydrALAZINE, sodium chloride    Allergies: Allergies   Allergen Reactions    Actonel [Risedronate Sodium] Other (See Comments)     Gastrointestinal upset.  Alendronate Sodium Other (See Comments)     Gastrointestinal upset    Altace [Ramipril]     Boniva [Ibandronic Acid]     Centrax [Prazepam]     Climara [Estradiol]     Doxycycline Calcium [Doxycycline Calcium]     Estrogenic Substance     Metoprolol Other (See Comments)     Severe se from generic.  Mobic [Meloxicam] Other (See Comments)     Fluid.  Pcn [Penicillins]     Sulfa Antibiotics     Triamterene-Hctz     Vioxx [Rofecoxib] Other (See Comments)     Dermatitis.  Dyazide [Hydrochlorothiazide W-Triamterene] Rash       Review of Systems:  Constitutional: Negative for fever, chills, fatigue. Skin:  Negative for pruritis, rash  Eyes: Negative for photophobia and visual disturbance. ENT:  Negative for rhinorrhea, epistaxis, sore throat  Respiratory:  Negative for cough and shortness of breath. Cardiovascular: Negative for chest pain. Gastrointestinal: Negative for nausea, vomiting, diarrhea. Genitourinary: Negative for dysuria and difficulty urinating. Neurological: Negative for confusion, dysarthria, tremors, seizures. Psychiatric:  Negative for depression or anxiety  Musculoskeletal:  Positive for left hip shortening and external rotation.     Objective:  Vitals:    01/25/22 0400   BP: (!) 153/89   Pulse: 103   Resp: 18   Temp: 97.8 °F (36.6 °C)   SpO2: 99%      Physical Examination:  GENERAL: No apparent distress, well-nourished  SKIN:  Warm and dry  EYES: Nonicteric. ENT: Mucous membranes moist  HEAD: Normocephalic, atraumatic  RESPIRATORY: Resp easy and unlabored  CARDIOVASCULAR: Regular rate and rhythm  GI: Abdomen soft, nontender  NEURO: Awake and alert, but not oriented. No speech defect  PSYCHIATRIC: Appropriate affect; not agitated  MUSCULOSKELETAL: Left hip  Inspection: On exam there are no ulcerations, rashes or lesions about the left hip. There is pain to palpation of the left hip. Range of motion deferred  Motor: Intact DF/PF on the left. Sensation: Unable to assess given her current mentation. She does have bruising noted to her left lateral shin. Vascular: 1+ left DP pulse. Labs reviewed:  Recent Labs     01/24/22 1933 01/25/22  0658   WBC 14.8* 10.4   HGB 11.6* 8.8*   HCT 35.8* 26.8*    169     Recent Labs     01/24/22 1933 01/25/22  0658    144   K 4.8 4.8    107   CO2 27 24   BUN 33* 33*   CREATININE 1.3* 1.2   GLUCOSE 151* 157*   CALCIUM 9.8 8.8     Recent Labs     01/24/22 1933   INR 0.97   PROTIME 11.0       Lab Results   Component Value Date    COLORU YELLOW 01/24/2022    CLARITYU CLOUDY (A) 01/24/2022    PHUR 7.0 01/24/2022    GLUCOSEU Negative 01/24/2022    BLOODU LARGE (A) 01/24/2022    LEUKOCYTESUR SMALL (A) 01/24/2022    BILIRUBINUR Negative 01/24/2022    UROBILINOGEN 1.0 01/24/2022    RBCUA 391 (H) 01/24/2022    WBCUA 12 (H) 01/24/2022       Imaging:  CT HEAD WO CONTRAST   Final Result   Stable small focal hyperdensity seen along the medial aspect of the right   temporal lobe felt related to subarachnoid hemorrhage. No new intracranial   hemorrhage or interval worsening. Other similar findings as above. CT HIP LEFT WO CONTRAST   Final Result   Acute intertrochanteric/subtrochanteric fracture of the left femur.          CT Cervical Spine WO Contrast   Final Result   Question minimal right medial temporal lobe subarachnoid hemorrhage. Consider short-term follow-up head CT. Multilevel degenerate changes Cervical spine without acute fracture traumatic   malalignment. Critical results were called by Dr. Bobo Astorga to Dr Eugene May on 1/24/2022 at   23:23. CT Head WO Contrast   Final Result   Question minimal right medial temporal lobe subarachnoid hemorrhage. Consider short-term follow-up head CT. Multilevel degenerate changes Cervical spine without acute fracture traumatic   malalignment. Critical results were called by Dr. Bobo Astorga to Dr Eugene May on 1/24/2022 at   23:23. XR HIP 2-3 VW W PELVIS LEFT   Final Result   Acute left femoral intertrochanteric fracture. XR CHEST PORTABLE   Final Result   No acute cardiopulmonary disease. IMPRESSION:  LEFT intertrochanteric/subtrochanteric hip fracture  Advanced dementia  Principal Problem:    Closed left hip fracture (HCC)  Active Problems:    Essential hypertension    Osteoarthritis    Age-related osteoporosis without current pathological fracture    UTI (urinary tract infection)    Anemia    Hip fracture requiring operative repair, left, closed, initial encounter ([facility]    Subarachnoid hemorrhage following injury ([facility] [address]  Resolved Problems:    * No resolved hospital problems. *      RECOMMENDATIONS:  We discussed both operative and non-operative treatments with our recommendation being for operative fixation. After discussion of risks and benefits, the patient's daughter Paty Le, next of kin agreed to proceed with surgery. - Schedule for LEFT hip intramedullary nailing with Dr. Rock Castro for this afternoon.  - NPO  - NWB left leg  - Pain control  - DVT prophylaxis:  hold anticoagulation today preoperatively.   - Appreciate pre-op clearance from Dr. Juan C Mcdonald  - Verify surgical consent  - Pre op orders placed   - rapid COVID negative on 1/24  - Dispo: Expect return to skilled side with [facility] in 1 to 2 days    Patient denies tobacco use.    I have reviewed imaging and plan with Dr. Zeina Lunsford. I agree with the History,Physical, Assessment and Plan of Vishal Hughes.        Laine Castro, YARI - CNP  1/25/2022  8:34 AM

## 2022-01-25 NOTE — PROGRESS NOTES
Patients pre-op assessment, checklist, H&P, and problem list reviewed during patient interview prior to going to surgery. Verified per Daughter.

## 2022-01-25 NOTE — ANESTHESIA PRE PROCEDURE
Department of Anesthesiology  Preprocedure Note       Name:  Alexis De Anda   Age:  80 y.o.  :  1928                                          MRN:  [id number]         Date:  2022      Surgeon: Karine Fajardo):  Janina Smith MD    Procedure: Procedure(s):  OPEN REDUCTION INTERNAL FIXATION OF INTERTROCHANTERIC FRACTURE OF LEFT HIP USING TROCHANTERIC FIXATION NAIL    Medications prior to admission:   Prior to Admission medications    Medication Sig Start Date End Date Taking?  Authorizing Provider   sucralfate (CARAFATE) 1 GM tablet TAKE ONE TABLET BY MOUTH FOUR TIMES A DAY 19  Yes Jami Montemayor MD   donepezil (ARICEPT) 5 MG tablet Take 5 mg by mouth nightly   Yes Historical Provider, MD   melatonin 3 MG TABS tablet Take 3 mg by mouth nightly as needed   Yes Historical Provider, MD   QUEtiapine (SEROQUEL) 25 MG tablet Take 1 tablet by mouth nightly  Patient taking differently: Take 50 mg by mouth nightly  19  Yes Jami Montemayor MD   ASPIRIN 81 PO Take by mouth   Yes Historical Provider, MD   cloNIDine (CATAPRES) 0.2 MG tablet TAKE 1 TABLET TWICE A DAY 10/2/18  Yes Jami Montemayor MD   TOPROL XL 25 MG extended release tablet TAKE 1 TABLET DAILY 10/2/18  Yes Jami Montemayor MD   allopurinol (ZYLOPRIM) 100 MG tablet TAKE ONE TABLET BY MOUTH TWICE DAILY 10/2/18  Yes Jami Montemayor MD   DIOVAN -25 MG per tablet TAKE 1 TABLET DAILY 10/2/18  Yes Jami Montemayor MD   omeprazole (PRILOSEC) 20 MG delayed release capsule TAKE ONE CAPSULE BY MOUTH DAILY 18  Yes aJmi Montemayor MD   [facility] EMUL Apply externally once daily 18  Yes Jami Montemayor MD       Current medications:    Current Facility-Administered Medications   Medication Dose Route Frequency Provider Last Rate Last Admin    pantoprazole (PROTONIX) tablet 40 mg  40 mg Oral QAM AC Chance Aguirre MD        cloNIDine (CATAPRES) tablet 0.1 mg  0.1 mg Oral BID Shelia Au Bebeto Calderon MD        metoprolol succinate (TOPROL XL) extended release tablet 25 mg  25 mg Oral Daily Darinel Cavanaugh MD   25 mg at 01/25/22 0900    allopurinol (ZYLOPRIM) tablet 100 mg  100 mg Oral BID Darinel Cavanaugh MD        QUEtiapine (SEROQUEL) tablet 50 mg  50 mg Oral Nightly Darinel Cavanaugh MD        donepezil (ARICEPT) tablet 5 mg  5 mg Oral Nightly Darinel Cavanaugh MD        melatonin tablet 3 mg  3 mg Oral Nightly PRN Darinel Cavanaugh MD        sucralfate (CARAFATE) tablet 1 g  1 g Oral 4 times per day Darinel Cavanaugh MD        0.9 % sodium chloride infusion   IntraVENous Continuous Darinel Cavanaugh MD 75 mL/hr at 01/25/22 0416 New Bag at 01/25/22 0416    sodium chloride flush 0.9 % injection 5-40 mL  5-40 mL IntraVENous 2 times per day Darinel Cavanaugh MD        sodium chloride flush 0.9 % injection 10 mL  10 mL IntraVENous PRN Darinel Cavanaugh MD        0.9 % sodium chloride infusion  25 mL IntraVENous PRN Darinel Cavanaugh MD        oxyCODONE (ROXICODONE) immediate release tablet 5 mg  5 mg Oral Q4H PRN Darinel Cavanaugh MD        Or    oxyCODONE (ROXICODONE) immediate release tablet 10 mg  10 mg Oral Q4H PRN Darinel Cavanaugh MD        morphine (PF) injection 2 mg  2 mg IntraVENous Q2H PRN Darinel Cavanaugh MD        Or    morphine injection 4 mg  4 mg IntraVENous Q2H PRN Darinel Cavanaugh MD   4 mg at 01/25/22 0422    ondansetron (ZOFRAN-ODT) disintegrating tablet 4 mg  4 mg Oral Q8H PRN Darinel Cavanaugh MD        Or    ondansetron [facility]) injection 4 mg  4 mg IntraVENous Q6H PRN Darinel Cavanaugh MD        magnesium hydroxide (MILK OF MAGNESIA) 400 MG/5ML suspension 30 mL  30 mL Oral Daily PRN Darinel Cavanaugh MD        potassium chloride (KLOR-CON M) extended release tablet 40 mEq  40 mEq Oral PRN Darinel Cavanaugh MD        Or    potassium bicarb-citric acid (EFFER-K) effervescent tablet 40 mEq  40 mEq Oral PRN Darinel Cavanaugh MD        Or    potassium chloride 10 mEq/100 mL IVPB (Peripheral Line)  10 mEq IntraVENous PRN Irineo Nunez MD        cefTRIAXone (ROCEPHIN) 1000 mg in sterile water 10 mL IV syringe  1,000 mg IntraVENous Q24H Irineo Nunez MD        hydrALAZINE (APRESOLINE) injection 10 mg  10 mg IntraVENous Q6H PRN Irineo Nunez MD   10 mg at 01/25/22 0424    0.9 % sodium chloride bolus  500 mL IntraVENous PRN Irineo Nunez MD        valsartan (DIOVAN) tablet 160 mg  160 mg Oral Daily Irineo Nunez MD        And    hydroCHLOROthiazide (HYDRODIURIL) tablet 25 mg  25 mg Oral Daily Irineo Nunez MD        0.9 % sodium chloride infusion  1,000 mL IntraVENous Continuous Kei Villafuerte PA-C   Stopped at 01/25/22 4242       Allergies: Allergies   Allergen Reactions    Actonel [Risedronate Sodium] Other (See Comments)     Gastrointestinal upset.  Alendronate Sodium Other (See Comments)     Gastrointestinal upset    Climara [Estradiol]     Doxycycline Calcium [Doxycycline Calcium]     Estrogenic Substance     Mobic [Meloxicam] Other (See Comments)     Fluid.  Pcn [Penicillins]     Sulfa Antibiotics     Dyazide [Hydrochlorothiazide W-Triamterene] Rash    Vioxx [Rofecoxib] Rash and Other (See Comments)     Dermatitis.         Problem List:    Patient Active Problem List   Diagnosis Code    Gout M10.9    Essential hypertension I10    Osteoarthritis M19.90    Memory loss R41.3    Hx of colonic polyps Z86.010    Edema of left lower extremity R60.0    Age-related osteoporosis without current pathological fracture M81.0    Closed left hip fracture ([facility] [address] S72.002A    UTI (urinary tract infection) N39.0    Anemia D64.9    Hip fracture requiring operative repair, left, closed, initial encounter ([facility] [address] S72.002A    Subarachnoid hemorrhage following injury ([facility] [address] M96.7A0M       Past Medical History:        Diagnosis Date    Disorder of bone and cartilage, unspecified     Esophagitis, unspecified     Gout, unspecified     Hx of colonic polyps  Hyperthyroidism     Memory loss     Osteoarthrosis, unspecified whether generalized or localized, unspecified site     Pedal edema     Unspecified essential hypertension        Past Surgical History:        Procedure Laterality Date    ANGIOPLASTY Bilateral     -renal art stenosis,        Social History:    Social History     Tobacco Use    Smoking status: Never Smoker    Smokeless tobacco: Never Used   Substance Use Topics    Alcohol use: No                                Counseling given: Not Answered      Vital Signs (Current):   Vitals:    01/25/22 0145 01/25/22 0400 01/25/22 0845 01/25/22 1400   BP: (!) 145/84 (!) 153/89 (!) 149/70 (!) 149/70   Pulse: 101 103 115 100   Resp: 16 18 16 16   Temp: 97.7 °F (36.5 °C) 97.8 °F (36.6 °C) 97.5 °F (36.4 °C) 97.9 °F (36.6 °C)   TempSrc: Axillary Axillary Axillary Axillary   SpO2: 100% 99% 100% 98%   Weight:       Height:                                                  BP Readings from Last 3 Encounters:   01/25/22 (!) 149/70   01/28/19 135/63   10/02/18 (!) 140/62       NPO Status:                                                                                 BMI:   Wt Readings from Last 3 Encounters:   01/24/22 110 lb (49.9 kg)   01/28/19 100 lb (45.4 kg)   10/02/18 98 lb 2 oz (44.5 kg)     Body mass index is 22.99 kg/m².     CBC:   Lab Results   Component Value Date    WBC 10.4 01/25/2022    RBC 3.08 01/25/2022    HGB 8.8 01/25/2022    HCT 26.8 01/25/2022    MCV 86.8 01/25/2022    RDW 14.1 01/25/2022     01/25/2022       CMP:   Lab Results   Component Value Date     01/25/2022    K 4.8 01/25/2022     01/25/2022    CO2 24 01/25/2022    BUN 33 01/25/2022    CREATININE 1.2 01/25/2022    GFRAA 51 01/25/2022    GFRAA 55 01/11/2013    AGRATIO 1.5 01/25/2022    LABGLOM 42 01/25/2022    GLUCOSE 157 01/25/2022    PROT 6.2 01/25/2022    PROT 7.9 01/11/2013    CALCIUM 8.8 01/25/2022    BILITOT <0.2 01/25/2022    ALKPHOS 60 01/25/2022    AST 20 01/25/2022    ALT 13 01/25/2022       POC Tests: No results for input(s): POCGLU, POCNA, POCK, POCCL, POCBUN, POCHEMO, POCHCT in the last 72 hours. Coags:   Lab Results   Component Value Date    PROTIME 11.0 01/24/2022    INR 0.97 01/24/2022    APTT 28.8 01/24/2022       HCG (If Applicable): No results found for: PREGTESTUR, PREGSERUM, HCG, HCGQUANT     ABGs: No results found for: PHART, PO2ART, SCB2UXE, KCV6IYV, BEART, K0LXIMWK     Type & Screen (If Applicable):  No results found for: LABABO, LABRH    Drug/Infectious Status (If Applicable):  No results found for: HIV, HEPCAB    COVID-19 Screening (If Applicable):   Lab Results   Component Value Date    COVID19 Not Detected 01/24/2022           Anesthesia Evaluation  Patient summary reviewed and Nursing notes reviewed no history of anesthetic complications:   Airway: Mallampati: III  TM distance: >3 FB   Neck ROM: limited  Mouth opening: > = 3 FB Dental: normal exam         Pulmonary:Negative Pulmonary ROS and normal exam  breath sounds clear to auscultation      (-) COPD, asthma, sleep apnea and not a current smoker                           Cardiovascular:  Exercise tolerance: poor (<4 METS),   (+) hypertension:,     (-) past MI, CAD, CABG/stent, dysrhythmias,  angina and  CHF    ECG reviewed  Rhythm: regular  Rate: normal                    Neuro/Psych:   (+) psychiatric history (Dementia):depression/anxiety    (-) seizures, TIA and CVA            ROS comment: Possible traumatic SAH vs artifact, not in common location for tSAH, no surgery indication GI/Hepatic/Renal:   (+) GERD: well controlled, renal disease: CRI,      (-) liver disease       Endo/Other:    (+) : arthritis (Gout and OA): OA., .    (-) diabetes mellitus, hypothyroidism, hyperthyroidism               Abdominal:             Vascular: Other Findings:             Anesthesia Plan      general     ASA 4       Induction: intravenous.     MIPS: Postoperative opioids intended and Prophylactic antiemetics administered. Anesthetic plan and risks discussed with patient, healthcare power of  and child/children. Plan discussed with CRNA.                   Mei Renee MD   1/25/2022

## 2022-01-25 NOTE — PROGRESS NOTES
Pharmacy Note     Renal Dose Adjustment    Vlad Mckenna is a 80 y.o. female. Pharmacist assessment of renally cleared medications. Recent Labs     01/24/22 1933 01/25/22  0658   BUN 33* 33*       Recent Labs     01/24/22 1933 01/25/22  0658   CREATININE 1.3* 1.2       CrCl cannot be calculated (Unknown ideal weight.). Estimated CrCl using Ideal Body Weight: 21 mL/min (based on IBW 45 kg)    Height:   Ht Readings from Last 1 Encounters:   01/24/22 4' 10\" (1.473 m)     Weight:  Wt Readings from Last 1 Encounters:   01/24/22 110 lb (49.9 kg)       The following medication dose has been adjusted based upon renal function per P&T Guidelines:             Pepcid dose reduced to 20 mg once daily. Lovenox dose reduced to 30 mg once daily.     Thank you,  Meño Lane, PharmD  1/25/2022 6:22 PM

## 2022-01-26 LAB
BLOOD BANK DISPENSE STATUS: NORMAL
BLOOD BANK DISPENSE STATUS: NORMAL
BLOOD BANK PRODUCT CODE: NORMAL
BLOOD BANK PRODUCT CODE: NORMAL
BPU ID: NORMAL
BPU ID: NORMAL
DESCRIPTION BLOOD BANK: NORMAL
DESCRIPTION BLOOD BANK: NORMAL
HCT VFR BLD CALC: 18.7 % (ref 36–48)
HEMOGLOBIN: 6.2 G/DL (ref 12–16)
MCH RBC QN AUTO: 28.1 PG (ref 26–34)
MCHC RBC AUTO-ENTMCNC: 33.2 G/DL (ref 31–36)
MCV RBC AUTO: 84.7 FL (ref 80–100)
PDW BLD-RTO: 16.1 % (ref 12.4–15.4)
PLATELET # BLD: 74 K/UL (ref 135–450)
PMV BLD AUTO: 8.3 FL (ref 5–10.5)
RBC # BLD: 2.2 M/UL (ref 4–5.2)
WBC # BLD: 6.7 K/UL (ref 4–11)

## 2022-01-26 PROCEDURE — 99024 POSTOP FOLLOW-UP VISIT: CPT | Performed by: NURSE PRACTITIONER

## 2022-01-26 PROCEDURE — 1200000000 HC SEMI PRIVATE

## 2022-01-26 PROCEDURE — 6370000000 HC RX 637 (ALT 250 FOR IP): Performed by: NURSE PRACTITIONER

## 2022-01-26 PROCEDURE — 97530 THERAPEUTIC ACTIVITIES: CPT

## 2022-01-26 PROCEDURE — 6360000002 HC RX W HCPCS: Performed by: ORTHOPAEDIC SURGERY

## 2022-01-26 PROCEDURE — 2580000003 HC RX 258: Performed by: NURSE PRACTITIONER

## 2022-01-26 PROCEDURE — 36415 COLL VENOUS BLD VENIPUNCTURE: CPT

## 2022-01-26 PROCEDURE — 97535 SELF CARE MNGMENT TRAINING: CPT

## 2022-01-26 PROCEDURE — 97162 PT EVAL MOD COMPLEX 30 MIN: CPT

## 2022-01-26 PROCEDURE — 6360000002 HC RX W HCPCS: Performed by: INTERNAL MEDICINE

## 2022-01-26 PROCEDURE — 6360000002 HC RX W HCPCS: Performed by: NURSE PRACTITIONER

## 2022-01-26 PROCEDURE — P9016 RBC LEUKOCYTES REDUCED: HCPCS

## 2022-01-26 PROCEDURE — 97166 OT EVAL MOD COMPLEX 45 MIN: CPT

## 2022-01-26 PROCEDURE — 6370000000 HC RX 637 (ALT 250 FOR IP): Performed by: ORTHOPAEDIC SURGERY

## 2022-01-26 PROCEDURE — 36430 TRANSFUSION BLD/BLD COMPNT: CPT

## 2022-01-26 PROCEDURE — 2580000003 HC RX 258: Performed by: ORTHOPAEDIC SURGERY

## 2022-01-26 PROCEDURE — APPNB45 APP NON BILLABLE 31-45 MINUTES: Performed by: NURSE PRACTITIONER

## 2022-01-26 PROCEDURE — 85027 COMPLETE CBC AUTOMATED: CPT

## 2022-01-26 RX ORDER — LORAZEPAM 2 MG/ML
1 INJECTION INTRAMUSCULAR EVERY 6 HOURS PRN
Status: DISCONTINUED | OUTPATIENT
Start: 2022-01-26 | End: 2022-01-27 | Stop reason: HOSPADM

## 2022-01-26 RX ORDER — TRAMADOL HYDROCHLORIDE 50 MG/1
50 TABLET ORAL EVERY 8 HOURS PRN
Qty: 12 TABLET | Refills: 0 | Status: SHIPPED | OUTPATIENT
Start: 2022-01-26 | End: 2022-01-29

## 2022-01-26 RX ORDER — SODIUM CHLORIDE 9 MG/ML
INJECTION, SOLUTION INTRAVENOUS PRN
Status: DISCONTINUED | OUTPATIENT
Start: 2022-01-26 | End: 2022-01-27 | Stop reason: HOSPADM

## 2022-01-26 RX ADMIN — FAMOTIDINE 20 MG: 20 TABLET, FILM COATED ORAL at 08:01

## 2022-01-26 RX ADMIN — HYDROCHLOROTHIAZIDE 25 MG: 25 TABLET ORAL at 08:01

## 2022-01-26 RX ADMIN — VALSARTAN 160 MG: 160 TABLET, FILM COATED ORAL at 08:01

## 2022-01-26 RX ADMIN — ACETAMINOPHEN 650 MG: 325 TABLET ORAL at 08:00

## 2022-01-26 RX ADMIN — ALLOPURINOL 100 MG: 100 TABLET ORAL at 08:01

## 2022-01-26 RX ADMIN — CLONIDINE HYDROCHLORIDE 0.1 MG: 0.1 TABLET ORAL at 20:44

## 2022-01-26 RX ADMIN — ALLOPURINOL 100 MG: 100 TABLET ORAL at 20:43

## 2022-01-26 RX ADMIN — CLONIDINE HYDROCHLORIDE 0.1 MG: 0.1 TABLET ORAL at 08:01

## 2022-01-26 RX ADMIN — TRAMADOL HYDROCHLORIDE 50 MG: 50 TABLET, FILM COATED ORAL at 15:09

## 2022-01-26 RX ADMIN — Medication 1000 MG: at 20:43

## 2022-01-26 RX ADMIN — ACETAMINOPHEN 650 MG: 325 TABLET ORAL at 12:15

## 2022-01-26 RX ADMIN — LORAZEPAM 1 MG: 2 INJECTION INTRAMUSCULAR; INTRAVENOUS at 23:08

## 2022-01-26 RX ADMIN — SUCRALFATE 1 G: 1 TABLET ORAL at 12:15

## 2022-01-26 RX ADMIN — SODIUM CHLORIDE: 9 INJECTION, SOLUTION INTRAVENOUS at 15:04

## 2022-01-26 RX ADMIN — METOPROLOL SUCCINATE 25 MG: 25 TABLET, FILM COATED, EXTENDED RELEASE ORAL at 08:00

## 2022-01-26 RX ADMIN — DONEPEZIL HYDROCHLORIDE 5 MG: 5 TABLET, FILM COATED ORAL at 20:44

## 2022-01-26 RX ADMIN — MORPHINE SULFATE 2 MG: 2 INJECTION, SOLUTION INTRAMUSCULAR; INTRAVENOUS at 08:04

## 2022-01-26 RX ADMIN — ACETAMINOPHEN 650 MG: 325 TABLET ORAL at 20:43

## 2022-01-26 RX ADMIN — LORAZEPAM 1 MG: 2 INJECTION INTRAMUSCULAR; INTRAVENOUS at 03:42

## 2022-01-26 RX ADMIN — QUETIAPINE FUMARATE 50 MG: 25 TABLET ORAL at 20:43

## 2022-01-26 RX ADMIN — MORPHINE SULFATE 2 MG: 2 INJECTION, SOLUTION INTRAMUSCULAR; INTRAVENOUS at 20:43

## 2022-01-26 RX ADMIN — Medication 10 ML: at 08:01

## 2022-01-26 RX ADMIN — SUCRALFATE 1 G: 1 TABLET ORAL at 18:08

## 2022-01-26 ASSESSMENT — PAIN SCALES - GENERAL
PAINLEVEL_OUTOF10: 5
PAINLEVEL_OUTOF10: 6
PAINLEVEL_OUTOF10: 0
PAINLEVEL_OUTOF10: 6
PAINLEVEL_OUTOF10: 0
PAINLEVEL_OUTOF10: 0
PAINLEVEL_OUTOF10: 6
PAINLEVEL_OUTOF10: 0
PAINLEVEL_OUTOF10: 6
PAINLEVEL_OUTOF10: 6

## 2022-01-26 ASSESSMENT — PAIN SCALES - WONG BAKER: WONGBAKER_NUMERICALRESPONSE: 8

## 2022-01-26 NOTE — PROGRESS NOTES
Physical Therapy    Facility/Department: [address] ORTHO/NEURO NURSING  Initial Assessment    NAME: Galina Sharma  : 1928  MRN: [id number]    Date of Service: 2022    Discharge Recommendations:Ivy Zhang scored a /24 on the AM-PAC short mobility form. Current research shows that an AM-PAC score of 17 or less is typically not associated with a discharge to the patient's home setting. Based on the patient's AM-PAC score and their current functional mobility deficits, it is recommended that the patient have 3-5 sessions per week of Physical Therapy at d/c to increase the patient's independence. Please see assessment section for further patient specific details. If patient discharges prior to next session this note will serve as a discharge summary. Please see below for the latest assessment towards goals. PT Equipment Recommendations  Equipment Needed: No    Assessment   Body structures, Functions, Activity limitations: Decreased functional mobility ; Decreased ADL status; Decreased ROM; Decreased strength;Decreased endurance;Decreased balance; Increased pain;Decreased cognition;Decreased safe awareness  Assessment: Pt presents as below her baseline function and would benefit from skilled PT services to promote safe return to PLOF. Prognosis: Fair  Decision Making: Medium Complexity  PT Education: Goals;PT Role;Plan of Care  Patient Education: D/C recommendations--pt unable to verbalize understanding  REQUIRES PT FOLLOW UP: Yes  Activity Tolerance  Activity Tolerance: Patient limited by cognitive status; Patient limited by pain       Patient Diagnosis(es): The primary encounter diagnosis was Closed fracture of left hip, initial encounter ([facility] [address] Diagnoses of Acute cystitis with hematuria, Dementia with behavioral disturbance, unspecified dementia type ([address] and Abnormal CT of the head were also pertinent to this visit.      has a past medical history of Disorder of bone and cartilage, unspecified, Esophagitis, unspecified, Gout, unspecified, Hx of colonic polyps, Hyperthyroidism, Memory loss, Osteoarthrosis, unspecified whether generalized or localized, unspecified site, Pedal edema, and Unspecified essential hypertension. has a past surgical history that includes angioplasty (Bilateral). Restrictions  Restrictions/Precautions  Restrictions/Precautions: Fall Risk,Weight Bearing  Required Braces or Orthoses?: No  Lower Extremity Weight Bearing Restrictions  Left Lower Extremity Weight Bearing: Weight Bearing As Tolerated  Position Activity Restriction  Other position/activity restrictions: 80 y.o. female who presented to [facility] ER after she apparently fell at her nursing home, [facility]. She resides in a locked dementia unit. She ambulates without assistive devices at baseline per her daughter. She does have advanced dementia and is unable to provide much history herself. Describes pain in the left hip of moderate intensity and of aching nature since the fall yesterday which is relieved by rest and morphine. Denies new numbness/tingling. Imaging review of the left hip via plain films and a CT scan demonstrated: Impacted intertrochanteric hip fracture with subtrochanteric extension. Vision/Hearing  Vision: Within Functional Limits  Hearing: Within functional limits     Subjective  General  Chart Reviewed: Yes  Patient assessed for rehabilitation services?: Yes  Response To Previous Treatment: Not applicable  Family / Caregiver Present: No (Pt's daughter, Cleve Ch, arriving near end of session for meeting with hospice services)  Diagnosis: Closed L hip fracture  Follows Commands: Impaired  General Comment  Comments: Pt supine in bed upon arrival.  Subjective  Subjective: RN approving PT/OT eval. Pt cognition impaired, but reporting need to use restroom;  [place] in place  Pain Screening  Patient Currently in Pain: Yes  Pain Assessment  Pain Assessment: Advanced Dementia  Paul-Baker Pain Rating: Hurts whole lot  Vital Signs  Patient Currently in Pain: Yes       Orientation  Orientation  Overall Orientation Status: Impaired  Orientation Level: Disoriented X4  Social/Functional History  Social/Functional History  Type of Home: Facility  Home Layout: One level  ADL Assistance: Needs assistance  Homemaking Responsibilities: No  Ambulation Assistance: Independent  Transfer Assistance: Independent  Active : No  Additional Comments: Per daughter, pt fell six weeks prior to admission. States that is assumed to be the cause of the [facility] noted on CT scan. States even since that fall 6 weeks ago, pt was still ambulatory and functioning near her baseline  Cognition        Objective     Observation/Palpation  Posture: Poor  Observation: Cranial based pulse oximeter; Dressings applied to left hip and intact. Currently recienving x2 units of blood upon arrvial    AROM RLE (degrees)  RLE AROM: WFL  PROM LLE (degrees)  LLE General PROM: Limited significantly by pain  AROM LLE (degrees)  LLE General AROM: Limited significantly by pain  Strength RLE  Strength RLE: WFL  Comment: Assessed via mobility  Strength LLE  Comment: Limited secondary to pain  Tone RLE  RLE Tone: Normotonic  Tone LLE  LLE Tone: Normotonic  Motor Control  Gross Motor?: WFL  Sensation  Overall Sensation Status: WFL  Bed mobility  Rolling to Right: Maximum assistance  Supine to Sit: Maximum assistance  Sit to Supine: 2 Person assistance;Maximum assistance  Scootin Person assistance;Maximal assistance (to [facility])  Transfers  Sit to Stand: Dependent/Total;2 Person Assistance  Stand to sit: Dependent/Total;2 Person Assistance  Comment: Poor tolerance to weight bearing on LLE, pt holds LLE in medial collapse position with poor tolerance to attempting neutral positioning of hip.   Ambulation  Ambulation?: No  Stairs/Curb  Stairs?: No     Balance  Posture: Poor  Sitting - Static: Poor  Sitting - Dynamic: Poor  Standing - Static: Poor  Standing - Dynamic: Poor        Plan   Plan  Times per week: 5-7x  Times per day: Daily  Current Treatment Recommendations: Strengthening,ROM,Balance Training,Functional Mobility Training,Transfer Training,Endurance Training,Gait Training,Stair training,Home Exercise Program,Safety Education & Training,Patient/Caregiver Education & Training  Safety Devices  Type of devices: All fall risk precautions in place,Call light within reach,Bed alarm in place,Gait belt,Patient at risk for falls,Left in bed,Nurse notified,Sitter present  Restraints  Initially in place: No    G-Code       OutComes Score                                                  AM-PAC Score  AM-PAC Inpatient Mobility Raw Score : 6 (01/26/22 1313)  AM-PAC Inpatient T-Scale Score : 23.55 (01/26/22 1313)  Mobility Inpatient CMS 0-100% Score: 100 (01/26/22 1313)  Mobility Inpatient CMS G-Code Modifier : CN (01/26/22 1313)          Goals  Short term goals  Time Frame for Short term goals:  To be met prior to discharge  Short term goal 1: Pt will complete bed mobility with max A  Short term goal 2: Pt will tolerate sitting EOB x3 minutes with mod A  Short term goal 3: Pt will complete sit to/from stand with max A  Short term goal 4: Pt will complete squat pivot transfer with max A       Therapy Time   Individual Concurrent Group Co-treatment   Time In 0923         Time Out 1008         Minutes 45         Timed Code Treatment Minutes: 27 Minutes     Annamaria Casanova, DPT, [id number]  Julio C Gustafson PT

## 2022-01-26 NOTE — PROGRESS NOTES
Progress Note - Dr. Mickey Mcintosh - Internal Medicine  PCP: Nita Alvarez MD [address] [place] [address] [phone]    Hospital Day: 2  Code Status: Full Code  Current Diet: ADULT DIET; Regular        CC: follow up on medical issues    Subjective:   Marce Kelly is a 80 y.o. female. Taken to OR yest per ortho  Procedure(s):  OPEN REDUCTION INTERNAL FIXATION OF INTERTROCHANTERIC FRACTURE OF LEFT HIP USING TROCHANTERIC FIXATION NAIL #2 biplanar C-arm fluoroscopic evaluation and interpretation    Pt anemic this am  I have discussed with the patient the rationale for blood component transfusion; its benefits in treating or preventing fatigue, organ damage, or death; and its risk which includes mild transfusion reactions, rare risk of blood borne infection, or more serious but rare reactions. I have discussed the alternatives to transfusion, including the risk and consequences of not receiving transfusion. The patient had an opportunity to ask questions and had agreed to proceed with transfusion of blood components. Pain controlled     I have reviewed the patient's medical and social history in detail and updated the computerized patient record. To recap: She  has a past medical history of Disorder of bone and cartilage, unspecified, Esophagitis, unspecified, Gout, unspecified, Hx of colonic polyps, Hyperthyroidism, Memory loss, Osteoarthrosis, unspecified whether generalized or localized, unspecified site, Pedal edema, and Unspecified essential hypertension. . She  has a past surgical history that includes angioplasty (Bilateral). . She  reports that she has never smoked. She has never used smokeless tobacco. She reports that she does not drink alcohol and does not use drugs. .        Active Hospital Problems    Diagnosis Date Noted    Subarachnoid hemorrhage following injury ([facility] [S06.6X9A] 01/25/2022    Closed left hip fracture ([facility] [address] Jannette Frazier 01/24/2022    UTI (urinary tract infection) [N39.0] 01/24/2022    Anemia [D64.9] 01/24/2022    Hip fracture requiring operative repair, left, closed, initial encounter [facility] Irineo Crockett 01/24/2022    Age-related osteoporosis without current pathological fracture [M81.0] 04/17/2018    Essential hypertension [I10]     Osteoarthritis [M19.90]        Current Facility-Administered Medications: LORazepam (ATIVAN) injection 1 mg, 1 mg, IntraVENous, Q6H PRN  0.9 % sodium chloride infusion, , IntraVENous, PRN  pantoprazole (PROTONIX) tablet 40 mg, 40 mg, Oral, QAM AC  cloNIDine (CATAPRES) tablet 0.1 mg, 0.1 mg, Oral, BID  metoprolol succinate (TOPROL XL) extended release tablet 25 mg, 25 mg, Oral, Daily  allopurinol (ZYLOPRIM) tablet 100 mg, 100 mg, Oral, BID  QUEtiapine (SEROQUEL) tablet 50 mg, 50 mg, Oral, Nightly  donepezil (ARICEPT) tablet 5 mg, 5 mg, Oral, Nightly  melatonin tablet 3 mg, 3 mg, Oral, Nightly PRN  sucralfate (CARAFATE) tablet 1 g, 1 g, Oral, 4 times per day  0.9 % sodium chloride infusion, , IntraVENous, Continuous  sodium chloride flush 0.9 % injection 5-40 mL, 5-40 mL, IntraVENous, 2 times per day  sodium chloride flush 0.9 % injection 10 mL, 10 mL, IntraVENous, PRN  0.9 % sodium chloride infusion, 25 mL, IntraVENous, PRN  morphine (PF) injection 2 mg, 2 mg, IntraVENous, Q2H PRN **OR** morphine injection 4 mg, 4 mg, IntraVENous, Q2H PRN  ondansetron (ZOFRAN-ODT) disintegrating tablet 4 mg, 4 mg, Oral, Q8H PRN **OR** ondansetron (ZOFRAN) injection 4 mg, 4 mg, IntraVENous, Q6H PRN  magnesium hydroxide (MILK OF MAGNESIA) 400 MG/5ML suspension 30 mL, 30 mL, Oral, Daily PRN  potassium chloride (KLOR-CON M) extended release tablet 40 mEq, 40 mEq, Oral, PRN **OR** potassium bicarb-citric acid (EFFER-K) effervescent tablet 40 mEq, 40 mEq, Oral, PRN **OR** potassium chloride 10 mEq/100 mL IVPB (Peripheral Line), 10 mEq, IntraVENous, PRN  cefTRIAXone (ROCEPHIN) 1000 mg in sterile water 10 mL IV syringe, 1,000 mg, IntraVENous, Q24H  hydrALAZINE (APRESOLINE) injection 10 mg, 10 mg, IntraVENous, Q6H PRN  0.9 % sodium chloride bolus, 500 mL, IntraVENous, PRN  valsartan (DIOVAN) tablet 160 mg, 160 mg, Oral, Daily **AND** hydroCHLOROthiazide (HYDRODIURIL) tablet 25 mg, 25 mg, Oral, Daily  0.9 % sodium chloride infusion, , IntraVENous, Continuous  sodium chloride flush 0.9 % injection 5-40 mL, 5-40 mL, IntraVENous, 2 times per day  sodium chloride flush 0.9 % injection 5-40 mL, 5-40 mL, IntraVENous, PRN  0.9 % sodium chloride infusion, 25 mL, IntraVENous, PRN  polyethylene glycol (GLYCOLAX) packet 17 g, 17 g, Oral, Daily PRN  enoxaparin (LOVENOX) injection 30 mg, 30 mg, SubCUTAneous, Daily  famotidine (PEPCID) tablet 20 mg, 20 mg, Oral, Daily  0.9 % sodium chloride infusion, , IntraVENous, PRN  traMADol (ULTRAM) tablet 50 mg, 50 mg, Oral, Q8H PRN  acetaminophen (TYLENOL) tablet 650 mg, 650 mg, Oral, TID         Objective:  BP (!) 169/61   Pulse 95   Temp 97.5 °F (36.4 °C) (Axillary)   Resp 16   Ht 4' 10\" (1.473 m)   Wt 110 lb (49.9 kg)   SpO2 98%   BMI 22.99 kg/m²      Patient Vitals for the past 24 hrs:   BP Temp Temp src Pulse Resp SpO2   01/26/22 0745 (!) 169/61 97.5 °F (36.4 °C) Axillary 95 16 98 %   01/26/22 0005 (!) 150/68 97.8 °F (36.6 °C) Axillary 94 16 94 %   01/25/22 2322 (!) 175/73 97.6 °F (36.4 °C) Axillary 102 18 98 %   01/25/22 2230  97.5 °F (36.4 °C) Axillary 94 18 96 %   01/25/22 2030 129/74 97.6 °F (36.4 °C) Axillary 96 16 96 %   01/25/22 2015 117/70 97.6 °F (36.4 °C) Oral 95 16 95 %   01/25/22 2013 125/70 97.6 °F (36.4 °C)  97 16 95 %   01/25/22 1915 139/68   96 16 95 %   01/25/22 1845 132/79   92  95 %   01/25/22 1815 (!) 138/53 97.7 °F (36.5 °C) Axillary 91     01/25/22 1730 (!) 166/58   93 18 94 %   01/25/22 1715 (!) 118/91   90 20 94 %   01/25/22 1710 (!) 188/125   85 12 100 %   01/25/22 1705 (!) 174/66   84 14 97 %   01/25/22 1700 (!) 153/52   81 8 100 %   01/25/22 1655 (!) 157/55   84 9 100 %   01/25/22 1654 (!) 157/55 98.2 °F (36.8 °C) Temporal 84 9 100 %   01/25/22 1424 (!) 159/58 100.2 °F (37.9 °C) Temporal 97 18 99 %   01/25/22 1400 (!) 149/70 97.9 °F (36.6 °C) Axillary 100 16 98 %   01/25/22 0845 (!) 149/70 97.5 °F (36.4 °C) Axillary 115 16 100 %     Patient Vitals for the past 96 hrs (Last 3 readings):   Weight   01/24/22 1801 110 lb (49.9 kg)           Intake/Output Summary (Last 24 hours) at 1/26/2022 5513  Last data filed at 1/25/2022 1658  Gross per 24 hour   Intake 600 ml   Output 400 ml   Net 200 ml         Physical Exam:   BP (!) 169/61   Pulse 95   Temp 97.5 °F (36.4 °C) (Axillary)   Resp 16   Ht 4' 10\" (1.473 m)   Wt 110 lb (49.9 kg)   SpO2 98%   BMI 22.99 kg/m²   General appearance: appears comfortable  Head: Normocephalic, without obvious abnormality, atraumatic  Lungs: clear to auscultation bilaterally  Heart: tachy, nl s1 s2  Abdomen: soft, non-tender; bowel sounds normal; no masses,  no organomegaly  Extremities: dressing CDI    Labs:  Lab Results   Component Value Date    WBC 6.7 01/26/2022    HGB 6.2 (LL) 01/26/2022    HCT 18.7 (LL) 01/26/2022    PLT 74 (L) 01/26/2022    CHOL 195 11/26/2016    TRIG 149 11/26/2016    HDL 57 11/26/2016    ALT 13 01/25/2022    AST 20 01/25/2022     01/25/2022    K 4.8 01/25/2022     01/25/2022    CREATININE 1.2 01/25/2022    BUN 33 (H) 01/25/2022    CO2 24 01/25/2022    TSH 2.15 11/26/2016    INR 0.97 01/24/2022    LABMICR YES 01/24/2022     Lab Results   Component Value Date    TROPONINI <0.01 01/24/2022           Recent Imaging Results are Reviewed:  CT HEAD WO CONTRAST    Result Date: 1/25/2022  EXAMINATION: CT OF THE HEAD WITHOUT CONTRAST  1/25/2022 6:08 am TECHNIQUE: CT of the head was performed without the administration of intravenous contrast. Dose modulation, iterative reconstruction, and/or weight based adjustment of the mA/kV was utilized to reduce the radiation dose to as low as reasonably achievable.  COMPARISON: CT study on 01/24/2022 HISTORY: ORDERING SYSTEM PROVIDED HISTORY: repeat HTC for ? ? SAH on original HCT TECHNOLOGIST PROVIDED HISTORY: Reason for exam:->repeat HTC for ? ? SAH on original HCT Has a \"code stroke\" or \"stroke alert\" been called? ->No Reason for Exam: repeat HCT for ? ? SAH on original HCT Relevant Medical/Surgical History: repeat HCT for ? ? SAH on original HCT FINDINGS: The study is degraded by motion. BRAIN/VENTRICLES: Again identified is a focal area of minimal extra-axial hyperdensity seen along the medial aspect of the right temporal lobe, unchanged from the previous examination, and best seen on axial image 21 of series 2. No new area of intracranial hemorrhage is identified. Generalized age-appropriate cortical atrophy is identified. ORBITS: The visualized portion of the orbits demonstrate no acute abnormality. SINUSES: Right mastoid effusion. No osseous destructive changes are identified. Mild chronic paranasal sinus disease. SOFT TISSUES/SKULL:  No acute abnormality of the visualized skull or soft tissues. Stable small focal hyperdensity seen along the medial aspect of the right temporal lobe felt related to subarachnoid hemorrhage. No new intracranial hemorrhage or interval worsening. Other similar findings as above. CT Head WO Contrast    Result Date: 1/24/2022  EXAMINATION: CT OF THE HEAD WITHOUT CONTRAST; CT OF THE CERVICAL SPINE WITHOUT CONTRAST 1/24/2022 10:28 pm TECHNIQUE: CT of the head was performed without the administration of intravenous contrast. Dose modulation, iterative reconstruction, and/or weight based adjustment of the mA/kV was utilized to reduce the radiation dose to as low as reasonably achievable.; CT of the cervical spine was performed without the administration of intravenous contrast. Multiplanar reformatted images are provided for review. Dose modulation, iterative reconstruction, and/or weight based adjustment of the mA/kV was utilized to reduce the radiation dose to as low as reasonably achievable. COMPARISON: None. HISTORY: ORDERING SYSTEM PROVIDED HISTORY: Fall TECHNOLOGIST PROVIDED HISTORY: Reason for exam:->Fall Has a \"code stroke\" or \"stroke alert\" been called? ->No Decision Support Exception - unselect if not a suspected or confirmed emergency medical condition->Emergency Medical Condition (MA) Reason for Exam: fall Relevant Medical/Surgical History: Fall (Came in from [facility] from [facility] from s/p fall with left hip pain. Hx of dementia. ) FINDINGS: BRAIN/VENTRICLES: Motion degradation. There is minimal extra-axial hyperdensity identified along the right medial temporal lobe along the the lateral margin of the anterior leaflet worrisome for minimal subarachnoid blood products. There is no acute intracranial hemorrhage, mass effect or midline shift. No abnormal extra-axial fluid collection. The gray-white differentiation is maintained without evidence of an acute infarct. There is no evidence of hydrocephalus. Mild involutional change and chronic microvascular disease. Vascular calcifications. ORBITS: The visualized portion of the orbits demonstrate no acute abnormality. SINUSES: Right mastoid effusion. No coalescence identified. Mild ethmoid sinus mucosal thickening. SOFT TISSUES/SKULL:  No acute abnormality of the visualized skull or soft tissues. CT cervical spine demonstrates no evidence acute fracture traumatic malalignment. Vertebral heights are maintained. Moderate multilevel degenerate changes most advanced C5 through C7. Moderate severe multilevel degenerate facet arthropathy. No prevertebral soft tissue swelling. Question minimal right medial temporal lobe subarachnoid hemorrhage. Consider short-term follow-up head CT. Multilevel degenerate changes Cervical spine without acute fracture traumatic malalignment. Critical results were called by Dr. Diamond Lynn to Dr Caroline Jain on 1/24/2022 at 23:23.      CT Cervical Spine WO Contrast    Result Date: 1/24/2022  EXAMINATION: CT OF THE HEAD WITHOUT CONTRAST; CT OF THE CERVICAL SPINE WITHOUT CONTRAST 1/24/2022 10:28 pm TECHNIQUE: CT of the head was performed without the administration of intravenous contrast. Dose modulation, iterative reconstruction, and/or weight based adjustment of the mA/kV was utilized to reduce the radiation dose to as low as reasonably achievable.; CT of the cervical spine was performed without the administration of intravenous contrast. Multiplanar reformatted images are provided for review. Dose modulation, iterative reconstruction, and/or weight based adjustment of the mA/kV was utilized to reduce the radiation dose to as low as reasonably achievable. COMPARISON: None. HISTORY: ORDERING SYSTEM PROVIDED HISTORY: Fall TECHNOLOGIST PROVIDED HISTORY: Reason for exam:->Fall Has a \"code stroke\" or \"stroke alert\" been called? ->No Decision Support Exception - unselect if not a suspected or confirmed emergency medical condition->Emergency Medical Condition (MA) Reason for Exam: fall Relevant Medical/Surgical History: Fall (Came in from [facility] from [facility] from s/p fall with left hip pain. Hx of dementia. ) FINDINGS: BRAIN/VENTRICLES: Motion degradation. There is minimal extra-axial hyperdensity identified along the right medial temporal lobe along the the lateral margin of the anterior leaflet worrisome for minimal subarachnoid blood products. There is no acute intracranial hemorrhage, mass effect or midline shift. No abnormal extra-axial fluid collection. The gray-white differentiation is maintained without evidence of an acute infarct. There is no evidence of hydrocephalus. Mild involutional change and chronic microvascular disease. Vascular calcifications. ORBITS: The visualized portion of the orbits demonstrate no acute abnormality. SINUSES: Right mastoid effusion. No coalescence identified. Mild ethmoid sinus mucosal thickening. SOFT TISSUES/SKULL:  No acute abnormality of the visualized skull or soft tissues. CT cervical spine demonstrates no evidence acute fracture traumatic malalignment. Vertebral heights are maintained. Moderate multilevel degenerate changes most advanced C5 through C7. Moderate severe multilevel degenerate facet arthropathy. No prevertebral soft tissue swelling. Question minimal right medial temporal lobe subarachnoid hemorrhage. Consider short-term follow-up head CT. Multilevel degenerate changes Cervical spine without acute fracture traumatic malalignment. Critical results were called by Dr. Shala Terry to Dr Carla Vale on 1/24/2022 at 23:23. XR CHEST PORTABLE    Result Date: 1/24/2022  EXAMINATION: ONE XRAY VIEW OF THE CHEST 1/24/2022 7:07 pm COMPARISON: 06/03/2017 HISTORY: ORDERING SYSTEM PROVIDED HISTORY: Suspect hip fracture, postop probability TECHNOLOGIST PROVIDED HISTORY: Reason for exam:->Suspect hip fracture, postop probability FINDINGS: The cardiac silhouette, mediastinal and hilar contours are normal.  The lungs are clear. There are no pleural effusions. No acute osseous abnormalities are identified. No acute cardiopulmonary disease. CT HIP LEFT WO CONTRAST    Result Date: 1/25/2022  EXAMINATION: CT OF THE LEFT HIP WITHOUT CONTRAST 1/24/2022 10:28 pm TECHNIQUE: CT of the left hip was performed without the administration of intravenous contrast.  Multiplanar reformatted images are provided for review. Dose modulation, iterative reconstruction, and/or weight based adjustment of the mA/kV was utilized to reduce the radiation dose to as low as reasonably achievable. COMPARISON: None. HISTORY ORDERING SYSTEM PROVIDED HISTORY: History fall 5 PM this evening, x-ray shows fracture TECHNOLOGIST PROVIDED HISTORY: Please evaluate pelvis with the CT scan.  Reason for exam:->History fall 5 PM this evening, x-ray shows fracture Decision Support Exception - unselect if not a suspected or confirmed emergency medical condition->Emergency Medical Condition (MA) Reason for Exam: History fall 5 PM this evening, x-ray shows fracture Relevant Medical/Surgical History: Fall (Came in from [facility] from [facility] from s/p fall with left hip pain. Hx of dementia. ) FINDINGS: Bones: The bones are osteopenic. Acute comminuted and angulated intertrochanteric/subtrochanteric fracture of the left femur is noted. There is no lytic or sclerotic lesion. No osseous erosion is seen. Soft Tissue: Ill-defined subcutaneous bruising/hematoma is seen on the lateral aspect of the left hip. Vascular calcification is noted. There is distal colonic diverticulosis. Large amount of retained stool is seen in the colon and rectum. A Mckeon catheter is noted in the bladder. Joint: Moderate degenerative changes of the bilateral hip joints are seen. There is small left hip joint hemarthrosis. Acute intertrochanteric/subtrochanteric fracture of the left femur. XR HIP 2-3 VW W PELVIS LEFT    Result Date: 1/24/2022  EXAMINATION: ONE XRAY VIEW OF THE PELVIS AND TWO XRAY VIEWS LEFT HIP 1/24/2022 7:07 pm COMPARISON: None. HISTORY: ORDERING SYSTEM PROVIDED HISTORY: Fall with left hip pain and deformity TECHNOLOGIST PROVIDED HISTORY: Reason for exam:->Fall with left hip pain and deformity FINDINGS: There is an acute closed traumatic completely displaced left proximal femoral intertrochanteric fracture with varus angulation of the fragments. Medially the fracture extends into the subtrochanteric region. The bones are osteopenic. Femoroacetabular alignment is maintained. Acute left femoral intertrochanteric fracture. Lab Results   Component Value Date    GLUCOSE 157 01/25/2022     Lab Results   Component Value Date    POCGLU 147 01/25/2022       Assessment and Plan:  Principal Problem:    Closed left hip fracture ([address] -Established problem. Stable. Plan: ortho took to OR 1/25  Active Problems:    Essential hypertension -Established problem. elevated 169/61  Plan: Continue present orders/plan.  Continue narcotics as adequate pain control can assist with adequate BP control. Osteoarthritis -Established problem. Stable. Plan: Continue present orders/plan. Age-related osteoporosis without current pathological fracture    UTI (urinary tract infection) -Established problem. Stable. Plan: cont iv abx    Anemia -Established problem. Low. 6.2  Plan: THERE IS indication for transfusion. Cont to monitor h/h to assess progression of anemia. Recommend ferrous sulfate or MVI as outpatient. Hip fracture requiring operative repair, left, closed, initial encounter ([facility] [address]    Subarachnoid hemorrhage following injury ([facility] [address] -Established problem. Stable. Plan: repeat ct shows stable SAH.  neurosurg consulted; they do not recommend further eval    Disp - plan to stabilize post op, return to SNF with hospice          Simin Lucero MD  1/26/2022

## 2022-01-26 NOTE — PROGRESS NOTES
[facility] Orthopedic Surgery   Progress Note    CHIEF COMPLAINT/DIAGNOSIS: S/p LEFT hip IM nailing     SUBJECTIVE: The patient is sitting up in bed being fed breakfast.  She does not respond appropriately commands or questions (baseline). Rodríguez snot appear in any pain/discomfort. OBJECTIVE  Physical    VITALS:  BP (!) 136/58   Pulse 87   Temp 97.2 °F (36.2 °C) (Axillary)   Resp 16   Ht 4' 10\" (1.473 m)   Wt 110 lb (49.9 kg)   SpO2 100%   BMI 22.99 kg/m²     GENERAL: Alert but disortiented at baseline, in no acute distress. MUSCULOSKELETAL: Able to dorsi and plantarflex the ankle on operative side spontaeously, but not to command. .   INCISION:  Covered with post-op dressing; clean dry and intact. ROM: Limited secondary pain and swelling. Sensory:  Unable to assess given mentation. Vascular:   2+ DP pulses with brisk cap refill;  calf soft     Data    ALL MEDICATIONS HAVE BEEN REVIEWED    CBC: Recent Labs     01/25/22  0658 01/25/22  1718 01/26/22  0614   WBC 10.4 12.0* 6.7   HGB 8.8* 6.1* 6.2*   HCT 26.8* 19.0* 18.7*    120* 74*     BMP:   Recent Labs     01/24/22  1933 01/25/22  0658    144   K 4.8 4.8    107   CO2 27 24   BUN 33* 33*   CREATININE 1.3* 1.2     INR:   Recent Labs     01/24/22 1933   INR 0.97     Intra-op films show good reduction with fixation via IM nail in good position. ASSESSMENT:  S/p LEFT hip IM nail (1/25/22), POD#1  Dementia  Acute blood loss anemia  UTI  HTN  Osteoporosis    PLAN:   - WB status:  WBAT through operative extremity   - DVT prophylaxis: Holding Lovenox and ASA given recent SAH and need for blood transfusion(s). OK to resume home ASA in 2 weeks per neurosurgery. Seems the benefits of ongoing bleeding outweigh the risks of DVT. - PT/OT  - Pain Control: Minimize narcotics as possible; scheduled tylenol. Tramadol script in chart. Due to orthopaedic surgical procedure/condition, patient may require pain medication for up to 6-8 weeks.   - Expected post op acute blood loss anemia: H/H today: 6.2/18.7 today. S/p 1   - ID: Ceftriaxone  - Disposition: await therapy eval.  Expect return to SNF when medically stable. Follow-up in four weeks with Dr. Bogdan Harvey.   Office # [phone]    YARI Gimenez CNP  1/26/2022  10:16 AM

## 2022-01-26 NOTE — PROGRESS NOTES
1 unit of blood infused last night due to low Hgb. Patient tolerated well, no signs of adverse reaction.

## 2022-01-26 NOTE — PLAN OF CARE
Problem: Falls - Risk of:  Goal: Will remain free from falls  Description: Will remain free from falls  Outcome: Ongoing  Goal: Absence of physical injury  Description: Absence of physical injury  Outcome: Ongoing     Problem: Pain:  Goal: Pain level will decrease  Description: Pain level will decrease  Outcome: Ongoing  Goal: Control of acute pain  Description: Control of acute pain  Outcome: Ongoing  Goal: Control of chronic pain  Description: Control of chronic pain  Outcome: Ongoing     Problem: Confusion - Acute:  Goal: Absence of continued neurological deterioration signs and symptoms  Description: Absence of continued neurological deterioration signs and symptoms  Outcome: Ongoing  Goal: Mental status will be restored to baseline  Description: Mental status will be restored to baseline  Outcome: Ongoing     Problem: Discharge Planning:  Goal: Ability to perform activities of daily living will improve  Description: Ability to perform activities of daily living will improve  Outcome: Ongoing  Goal: Participates in care planning  Description: Participates in care planning  Outcome: Ongoing     Problem: Injury - Risk of, Physical Injury:  Goal: Will remain free from falls  Description: Will remain free from falls  Outcome: Ongoing  Goal: Absence of physical injury  Description: Absence of physical injury  Outcome: Ongoing     Problem: Mood - Altered:  Goal: Mood stable  Description: Mood stable  Outcome: Ongoing  Goal: Absence of abusive behavior  Description: Absence of abusive behavior  Outcome: Ongoing  Goal: Verbalizations of feeling emotionally comfortable while being cared for will increase  Description: Verbalizations of feeling emotionally comfortable while being cared for will increase  Outcome: Ongoing     Problem: Psychomotor Activity - Altered:  Goal: Absence of psychomotor disturbance signs and symptoms  Description: Absence of psychomotor disturbance signs and symptoms  Outcome: Ongoing Problem: Sensory Perception - Impaired:  Goal: Demonstrations of improved sensory functioning will increase  Description: Demonstrations of improved sensory functioning will increase  Outcome: Ongoing  Goal: Decrease in sensory misperception frequency  Description: Decrease in sensory misperception frequency  Outcome: Ongoing  Goal: Able to refrain from responding to false sensory perceptions  Description: Able to refrain from responding to false sensory perceptions  Outcome: Ongoing  Goal: Demonstrates accurate environmental perceptions  Description: Demonstrates accurate environmental perceptions  Outcome: Ongoing  Goal: Able to distinguish between reality-based and nonreality-based thinking  Description: Able to distinguish between reality-based and nonreality-based thinking  Outcome: Ongoing  Goal: Able to interrupt nonreality-based thinking  Description: Able to interrupt nonreality-based thinking  Outcome: Ongoing     Problem: Sleep Pattern Disturbance:  Goal: Appears well-rested  Description: Appears well-rested  Outcome: Ongoing     Problem: Skin Integrity:  Goal: Will show no infection signs and symptoms  Description: Will show no infection signs and symptoms  Outcome: Ongoing  Goal: Absence of new skin breakdown  Description: Absence of new skin breakdown  Outcome: Ongoing

## 2022-01-26 NOTE — PROGRESS NOTES
PALLIATIVE MEDICINE PROGRESS NOTE     Patient name:Ivy Hernandez    J:[id number] :1928  Room/Bed:[facility]4473/4473-01    LOS: 2 days        ASSESSMENT/RECOMMENDATIONS     80 y.o. female with AMS and left leg pain with alzheimers        Symptom Management:  1. AMS- alzheimer's at baseline now with subarachnoid hemorrhage  2. Leg pain- s/p fracture after a fall   3. Goals of Care- Plan to DC tomorrow back to LTC with [facility]. Enrolled in [employer] today.       Patient/Family Goals of Care :      pt does not have capacity to make decisions talked to daughter Vidhya Pena at bedside she reports that pt doesn't want intubation or CPR and is a [facility]. She is pts HCPOA and brought paperwork. We discussed plan for surgery to hip to help with pain and possibly mobility. Hospice at DC with the goal of no additional hospital admissions and extra support for end stage Alzheimers. Daughter requests [facility] as she sees them in the building frequently.   Plan to DC tomorrow back to LTC with [facility]. Enrolled in [employer] today.            Disposition/Discharge Plan:   pending     Advance Directives:  · Surrogate Decision Maker: Raisa-daughter  · Code status:  DNR-CC     Case discussed with: patient, floor RN  Thank you for allowing us to participate in the care of this patient. SUBJECTIVE     Chief Complaint: Leg pain    Last 24 hours:   Pt is resting comfortably today    ROS:  Review of Systems -   History obtained from chart review and unobtainable from patient due to mental status      Patient unable to complete full ROS due to current cognitive status. Information that is obtained from nursing and chart. OBJECTIVE   BP (!) 94/57   Pulse 65   Temp 97.6 °F (36.4 °C) (Axillary)   Resp 16   Ht 4' 10\" (1.473 m)   Wt 110 lb (49.9 kg)   SpO2 98%   BMI 22.99 kg/m²   I/O last 3 completed shifts:   In: 600 [I.V.:600]  Out: 850 [Urine:450; Blood:400]  I/O this shift:  In: 610.8 [P.O.:240; Blood:370.8]  Out: -       Physical Examination:   General appearance - chronically ill appearing  Mental status - confused  Neck - supple, no significant adenopathy  Chest - clear to auscultation, no wheezes, rales or rhonchi, symmetric air entry  Abdomen - soft, nontender, nondistended, no masses or organomegaly  Musculoskeletal - frail with decreased ROM and pain with palpation of left leg  Skin - normal coloration and turgor, no rashes, no suspicious skin lesions noted         Total time: 35 minutes  >50% of time spent counseling patient at bedside or POA/family member if applicable , reviewing information and discussing care, coordinating with care team       Signed By: Electronically signed by YARI Lawson CNP on 1/26/2022 at 1:41 PM   Palliative Medicine       January 26, 2022 immune

## 2022-01-26 NOTE — PROGRESS NOTES
Occupational Therapy   Occupational Therapy Initial Assessment  Date: 2022   Patient Name: Simin Escobedo  MRN: [id number]     : 1928    Date of Service: 2022    Discharge Recommendations:Ivy Marin scored a 6/24 on the AM-PAC ADL Inpatient form. Current research shows that an AM-PAC score of 17 or less is typically not associated with a discharge to the patient's home setting. Based on the patient's AM-PAC score and their current ADL deficits, it is recommended that the patient have 3-5 sessions per week of Occupational Therapy at d/c to increase the patient's independence. Daughter currently agreeable to continue with orthopedic remedial goals. Please see assessment section for further patient specific details. If patient discharges prior to next session this note will serve as a discharge summary. Please see below for the latest assessment towards goals. 3-5 sessions per week  OT Equipment Recommendations  Equipment Needed:  (TBD pending pt progress/status)    Assessment   Performance deficits / Impairments: Decreased functional mobility ; Decreased endurance;Decreased ADL status; Decreased safe awareness;Decreased high-level IADLs;Decreased ROM; Decreased cognition;Decreased balance  Assessment: Pt presenting significantly below her baseline with the above deficits. Pain and cognition substantially impacting participation and carryover with all ADLs and mobility. Per  and daughter, family is considering hospice. Continued OT indicated in order to address the above deficits. Treatment Diagnosis: above deficits associated with Abnormal CT of the head and ORIF of  L trochanter. Prognosis: Fair  Decision Making: Medium Complexity  Exam: as above  Assistance / Modification: Lift equipment; Dependent  OT Education: OT Role;Plan of Care  Patient Education: Daughter educated on DC recommendations and POC. verbalized understanding.   Barriers to Learning: cognition  REQUIRES OT FOLLOW UP: Yes  Activity Tolerance  Activity Tolerance: Patient limited by pain;Treatment limited secondary to decreased cognition;Treatment limited secondary to medical complications (free text)  Safety Devices  Safety Devices in place: Yes  Type of devices: Bed alarm in place; All fall risk precautions in place; Left in bed  Restraints  Initially in place: No           Patient Diagnosis(es): The primary encounter diagnosis was Closed fracture of left hip, initial encounter ([facility] [address] Diagnoses of Acute cystitis with hematuria, Dementia with behavioral disturbance, unspecified dementia type ([facility] [address] and Abnormal CT of the head were also pertinent to this visit. has a past medical history of Disorder of bone and cartilage, unspecified, Esophagitis, unspecified, Gout, unspecified, Hx of colonic polyps, Hyperthyroidism, Memory loss, Osteoarthrosis, unspecified whether generalized or localized, unspecified site, Pedal edema, and Unspecified essential hypertension. has a past surgical history that includes angioplasty (Bilateral). Treatment Diagnosis: above deficits associated with Abnormal CT of the head and ORIF of  L trochanter. Restrictions  Restrictions/Precautions  Restrictions/Precautions: Fall Risk,Weight Bearing  Required Braces or Orthoses?: No  Lower Extremity Weight Bearing Restrictions  Left Lower Extremity Weight Bearing: Weight Bearing As Tolerated  Position Activity Restriction  Other position/activity restrictions: 80 y.o. female who presented to [facility] ER after she apparently fell at her nursing home, [facility]. She resides in a locked dementia unit. She ambulates without assistive devices at baseline per her daughter. She does have advanced dementia and is unable to provide much history herself. Describes pain in the left hip of moderate intensity and of aching nature since the fall yesterday which is relieved by rest and morphine. Denies new numbness/tingling.    Imaging review of the left hip via plain films and a CT scan demonstrated: Impacted intertrochanteric hip fracture with subtrochanteric extension. Subjective   General  Chart Reviewed: Yes  Patient assessed for rehabilitation services?: Yes  Diagnosis: Abnormal CT of the head; ORIF- INTERTROCHANTERIC FRACTURE OF LEFT HIP USING TROCHANTERIC FIXATION  Subjective  Subjective: Pt supine in bed. Confused with Alzheimers dementia baseline. Daugther present at conclusion of evaluation  Patient Currently in Pain: Yes  Pain Assessment  Pain Assessment: Advanced Dementia  Paul-Baker Pain Rating: Hurts whole lot  Vital Signs  Temp: 97.2 °F (36.2 °C)  Temp Source: Axillary  Pulse: 87  Resp: 16  BP: (!) 136/58  Patient Currently in Pain: Yes  Oxygen Therapy  SpO2: 100 %  Social/Functional History  Social/Functional History  Type of Home: Facility  Home Layout: One level  ADL Assistance: Needs assistance  Homemaking Responsibilities: No  Ambulation Assistance: Independent  Transfer Assistance: Independent  Active : No  Additional Comments: Per daughter, pt fell six weeks prior to admission. States that is assumed to be the cause of the [facility] noted on CT scan. States even since that fall 6 weeks ago, pt was still ambulatory and functioning near her baseline       Objective        Orientation  Overall Orientation Status: Impaired  Orientation Level: Disoriented X4  Observation/Palpation  Posture: Poor  Observation: Cranial based pulse oximeter; Dressings applied to left hip and intact. Currently recienving x2 units of blood upon arrvial  Balance  Sitting Balance: Maximum assistance (Sat at EOB for x 6-8 min)  Standing Balance: Unable to assess(comment)  Functional Mobility  Functional Mobility Comments: Not assessed. Pt cognition and hip pain limiting participation this session. Toilet Transfers  Toilet Transfers Comments: Not assessed.   ADL  Feeding: Increased time to complete;Dependent/Total (Pt unable initiate feeding despite hand over hand for finger feed. She is currently dependent for all feeding with increased time for mastication.)  Additional Comments: Pt cognition impacting ADL participation. She is confused and fixated on cranial pulse oximeter and her gown with heavy cues for redirection. Tone RUE  RUE Tone: Normotonic  Tone LUE  LUE Tone: Normotonic  Coordination  Movements Are Fluid And Coordinated: Yes  Coordination and Movement description: Decreased speed     Bed mobility  Rolling to Right: Maximum assistance  Supine to Sit: Maximum assistance  Sit to Supine: 2 Person assistance;Maximum assistance  Scootin Person assistance;Maximal assistance (to [facility])  Comment: Pt fixated with figdeting/fixation behaviors regarding her clothing--tactile stimuli in conjunction with hip pain requiring 2 person assist for safety  Transfers  Transfer Comments: Attempted x2 sit>stand max A x 2 at EOB; however, pt verbalizing significant pain (demeanor, facial grimmace, and voice tone indicating pain of substantial magnitude). Unable to attempt additional mobility or transfers. Vision - Basic Assessment  Vision Comments: Pt with difficulty keeping eyes open. Cognition  Overall Cognitive Status: Exceptions  Arousal/Alertness: Delayed responses to stimuli;Inconsistent responses to stimuli  Following Commands: Does not follow commands; Inconsistently follows commands  Memory: Decreased recall of precautions;Decreased short term memory;Decreased recall of biographical Information;Decreased recall of recent events  Safety Judgement: Decreased awareness of need for assistance;Decreased awareness of need for safety  Problem Solving:  (No problem solving ability)  Insights: Not aware of deficits  Initiation: Requires cues for all  Sequencing: Requires cues for all  Cognition Comment: Pt without insight into her funcitonal deficits and unable to problem solve at this time.  Requires max verbal and tactile cues for initiation with all tasks.  Perception  Overall Perceptual Status: WFL     Sensation  Overall Sensation Status: [facility]       Plan   Plan  Times per week: 5-7  Times per day: Daily  Current Treatment Recommendations: Functional Mobility Training,Patient/Caregiver Education & Training,Self-Care / ADL,Safety Education & Training,Balance Training,Endurance Training    AM-PAC Score        AM-PAC Inpatient Daily Activity Raw Score: 6 (01/26/22 1016)  AM-PAC Inpatient ADL T-Scale Score : 17.07 (01/26/22 1016)  ADL Inpatient CMS 0-100% Score: 100 (01/26/22 1016)  ADL Inpatient CMS G-Code Modifier : CN (01/26/22 1016)    Goals  Short term goals  Time Frame for Short term goals: Discharge  Short term goal 1: Toileting at [facility] Max A x2  Short term goal 2: Functional sit<>stand/ stand>pivot txfers Max A x2       Therapy Time   Individual Concurrent Group Co-treatment   Time In 0092         Time Out 1008         Minutes 45           Timed Code Treatment Minutes:   30 minutes    Total Treatment Minutes:  45 minutes    GHISLAINE Barnes OTR/L  [id number]

## 2022-01-26 NOTE — CARE COORDINATION
Discharge Planning Note:      The patient is being followed by [facility]. Will continue to follow.     DIDIER Echavarria RN      Phone: [phone]

## 2022-01-27 VITALS
TEMPERATURE: 98 F | HEIGHT: 58 IN | WEIGHT: 110 LBS | DIASTOLIC BLOOD PRESSURE: 69 MMHG | BODY MASS INDEX: 23.09 KG/M2 | OXYGEN SATURATION: 95 % | RESPIRATION RATE: 16 BRPM | HEART RATE: 80 BPM | SYSTOLIC BLOOD PRESSURE: 152 MMHG

## 2022-01-27 PROCEDURE — 97530 THERAPEUTIC ACTIVITIES: CPT

## 2022-01-27 PROCEDURE — 2580000003 HC RX 258: Performed by: ORTHOPAEDIC SURGERY

## 2022-01-27 PROCEDURE — 6370000000 HC RX 637 (ALT 250 FOR IP): Performed by: ORTHOPAEDIC SURGERY

## 2022-01-27 PROCEDURE — 97535 SELF CARE MNGMENT TRAINING: CPT

## 2022-01-27 PROCEDURE — 6360000002 HC RX W HCPCS: Performed by: NURSE PRACTITIONER

## 2022-01-27 PROCEDURE — 6370000000 HC RX 637 (ALT 250 FOR IP): Performed by: NURSE PRACTITIONER

## 2022-01-27 RX ADMIN — Medication 10 ML: at 08:02

## 2022-01-27 RX ADMIN — ACETAMINOPHEN 650 MG: 325 TABLET ORAL at 08:02

## 2022-01-27 RX ADMIN — SODIUM CHLORIDE: 9 INJECTION, SOLUTION INTRAVENOUS at 00:18

## 2022-01-27 RX ADMIN — PANTOPRAZOLE SODIUM 40 MG: 40 TABLET, DELAYED RELEASE ORAL at 06:09

## 2022-01-27 RX ADMIN — MORPHINE SULFATE 2 MG: 2 INJECTION, SOLUTION INTRAMUSCULAR; INTRAVENOUS at 10:45

## 2022-01-27 RX ADMIN — MORPHINE SULFATE 2 MG: 2 INJECTION, SOLUTION INTRAMUSCULAR; INTRAVENOUS at 06:22

## 2022-01-27 RX ADMIN — SUCRALFATE 1 G: 1 TABLET ORAL at 10:45

## 2022-01-27 RX ADMIN — METOPROLOL SUCCINATE 25 MG: 25 TABLET, FILM COATED, EXTENDED RELEASE ORAL at 08:02

## 2022-01-27 RX ADMIN — CLONIDINE HYDROCHLORIDE 0.1 MG: 0.1 TABLET ORAL at 08:02

## 2022-01-27 RX ADMIN — FAMOTIDINE 20 MG: 20 TABLET, FILM COATED ORAL at 08:01

## 2022-01-27 RX ADMIN — SUCRALFATE 1 G: 1 TABLET ORAL at 06:09

## 2022-01-27 RX ADMIN — TRAMADOL HYDROCHLORIDE 50 MG: 50 TABLET, FILM COATED ORAL at 14:22

## 2022-01-27 RX ADMIN — ACETAMINOPHEN 650 MG: 325 TABLET ORAL at 14:22

## 2022-01-27 RX ADMIN — ALLOPURINOL 100 MG: 100 TABLET ORAL at 08:02

## 2022-01-27 RX ADMIN — HYDROCHLOROTHIAZIDE 25 MG: 25 TABLET ORAL at 08:02

## 2022-01-27 RX ADMIN — TRAMADOL HYDROCHLORIDE 50 MG: 50 TABLET, FILM COATED ORAL at 08:05

## 2022-01-27 RX ADMIN — VALSARTAN 160 MG: 160 TABLET, FILM COATED ORAL at 08:05

## 2022-01-27 ASSESSMENT — PAIN SCALES - GENERAL
PAINLEVEL_OUTOF10: 0
PAINLEVEL_OUTOF10: 6
PAINLEVEL_OUTOF10: 8
PAINLEVEL_OUTOF10: 0
PAINLEVEL_OUTOF10: 6
PAINLEVEL_OUTOF10: 0
PAINLEVEL_OUTOF10: 7
PAINLEVEL_OUTOF10: 0
PAINLEVEL_OUTOF10: 0

## 2022-01-27 ASSESSMENT — PAIN SCALES - PAIN ASSESSMENT IN ADVANCED DEMENTIA (PAINAD)
BODYLANGUAGE: 0
BODYLANGUAGE: 0
TOTALSCORE: 0
TOTALSCORE: 6
NEGVOCALIZATION: 1
BREATHING: 0
CONSOLABILITY: 1
NEGVOCALIZATION: 0
FACIALEXPRESSION: 2
NEGVOCALIZATION: 0
BREATHING: 0
BREATHING: 0
CONSOLABILITY: 0
CONSOLABILITY: 0
FACIALEXPRESSION: 0
BODYLANGUAGE: 2
TOTALSCORE: 0
FACIALEXPRESSION: 0

## 2022-01-27 ASSESSMENT — PAIN SCALES - WONG BAKER
WONGBAKER_NUMERICALRESPONSE: 0
WONGBAKER_NUMERICALRESPONSE: 6

## 2022-01-27 NOTE — DISCHARGE SUMMARY
[facility] -- Physician Discharge Summary     Scarlett Carrel  12/21/1928  MRN: [id number]    Admit Date: 1/24/2022  Discharge Date: No discharge date for patient encounter. Attending MD: Joslyn Miller MD  Discharging MD: Joslyn Miller MD  PCP: Milton Vásquez MD [address] [place] [address] Drive [phone]    Admission Diagnosis: Abnormal CT of the head [R93.0]  Acute cystitis with hematuria [N30.01]  Closed fracture of left hip, initial encounter [facility]) [S72.002A]  Hip fracture requiring operative repair, left, closed, initial encounter ([facility] [address] Shelia Arteaga  Dementia with behavioral disturbance, unspecified dementia type ([facility] [address] [F03.91]  DISCHARGE DIAGNOSIS: same    Full Hospital Problem List:  Active Hospital Problems    Diagnosis Date Noted    Subarachnoid hemorrhage following injury ([facility] [address] [S06.6X9A] 01/25/2022    Closed left hip fracture ([facility] [address] Shelia Arteaga 01/24/2022    UTI (urinary tract infection) [N39.0] 01/24/2022    Anemia [D64.9] 01/24/2022    Hip fracture requiring operative repair, left, closed, initial encounter ([facility] [address] Shelia Arteaga 01/24/2022    Age-related osteoporosis without current pathological fracture [M81.0] 04/17/2018    Essential hypertension [I10]     Osteoarthritis [facility] Course:   80 y. o. female [facility].  Approximately 5 PM this evening patient apparently fell on left hip with complaint of pain and deformity.  Hip appears rotated inward and shortened.  She has no other related complaints.  Patient with known Alzheimer dementia.     Hip film reviewed    Impression:       Acute left femoral intertrochanteric fracture.             CT Head also done    Impression:       Question minimal right medial temporal lobe subarachnoid hemorrhage.    Consider short-term follow-up head CT.          Neurosurgery recommends short term followup ct  Pt is not transferred to [facility]. as family does not want aggressive intervention of this  Followup ct head shows stability in poss SAH  No further w/u recommended    Family still wishes to go ahead with ortho procedure  Date of Procedure: 1/25/2022     Pre-Op Diagnosis: Intertrochanteric Fracture of Left Hip     Post-Op Diagnosis: Same       Procedure(s):  OPEN REDUCTION INTERNAL FIXATION OF INTERTROCHANTERIC FRACTURE OF LEFT HIP USING TROCHANTERIC FIXATION NAIL #2 biplanar C-arm fluoroscopic evaluation and interpretation     Surgeon(s):  Kota Anguiano MD    Pt does well post op  Plan to return to SNF at d/c  Family wants to enroll with hospice    Consults made during Hospitalization:  IP CONSULT TO INTERNAL MEDICINE  IP CONSULT TO NEUROSURGERY  IP CONSULT TO [address]  IP CONSULT TO NEUROSURGERY  IP CONSULT TO PALLIATIVE CARE  IP CONSULT TO HOSPICE  IP CONSULT TO SOCIAL WORK    Treatment team at time of Discharge: Treatment Team: Attending Provider: Antonio Lomeli MD; Consulting Physician: Antonio Lomeli MD; Consulting Physician: YARI Shaw - CNP; Consulting Physician: Ney Kline MD; Consulting Physician: Leyla Ashley RN; Surgeon: Kota Anguiano MD; Respiratory Therapist (Day): Claudia King RCP; Registered Nurse: Leon Rivera RN; Patient Care Tech: Amanda Camdenton; Utilization Reviewer: Ronald Gould RN; Physical Therapist: Gabino Morrell PT    Imaging Results:  XR HIP LEFT (2-3 VIEWS)    Result Date: 1/25/2022  Radiology exam is complete. No Radiologist dictation. Please follow up with ordering provider. CT HEAD WO CONTRAST    Result Date: 1/25/2022  EXAMINATION: CT OF THE HEAD WITHOUT CONTRAST  1/25/2022 6:08 am TECHNIQUE: CT of the head was performed without the administration of intravenous contrast. Dose modulation, iterative reconstruction, and/or weight based adjustment of the mA/kV was utilized to reduce the radiation dose to as low as reasonably achievable.  COMPARISON: CT study on 01/24/2022 HISTORY: ORDERING SYSTEM PROVIDED HISTORY: repeat HTC for ? ? SAH on original HCT TECHNOLOGIST PROVIDED HISTORY: Reason for exam:->repeat HTC for ? ? SAH on original HCT Has a \"code stroke\" or \"stroke alert\" been called? ->No Reason for Exam: repeat HCT for ? ? SAH on original HCT Relevant Medical/Surgical History: repeat HCT for ? ? SAH on original HCT FINDINGS: The study is degraded by motion. BRAIN/VENTRICLES: Again identified is a focal area of minimal extra-axial hyperdensity seen along the medial aspect of the right temporal lobe, unchanged from the previous examination, and best seen on axial image 21 of series 2. No new area of intracranial hemorrhage is identified. Generalized age-appropriate cortical atrophy is identified. ORBITS: The visualized portion of the orbits demonstrate no acute abnormality. SINUSES: Right mastoid effusion. No osseous destructive changes are identified. Mild chronic paranasal sinus disease. SOFT TISSUES/SKULL:  No acute abnormality of the visualized skull or soft tissues. Stable small focal hyperdensity seen along the medial aspect of the right temporal lobe felt related to subarachnoid hemorrhage. No new intracranial hemorrhage or interval worsening. Other similar findings as above. CT Head WO Contrast    Result Date: 1/24/2022  EXAMINATION: CT OF THE HEAD WITHOUT CONTRAST; CT OF THE CERVICAL SPINE WITHOUT CONTRAST 1/24/2022 10:28 pm TECHNIQUE: CT of the head was performed without the administration of intravenous contrast. Dose modulation, iterative reconstruction, and/or weight based adjustment of the mA/kV was utilized to reduce the radiation dose to as low as reasonably achievable.; CT of the cervical spine was performed without the administration of intravenous contrast. Multiplanar reformatted images are provided for review.  Dose modulation, iterative reconstruction, and/or weight based adjustment of the mA/kV was utilized to reduce the radiation dose to as low as reasonably achievable. COMPARISON: None. HISTORY: ORDERING SYSTEM PROVIDED HISTORY: Fall TECHNOLOGIST PROVIDED HISTORY: Reason for exam:->Fall Has a \"code stroke\" or \"stroke alert\" been called? ->No Decision Support Exception - unselect if not a suspected or confirmed emergency medical condition->Emergency Medical Condition (MA) Reason for Exam: fall Relevant Medical/Surgical History: Fall (Came in from [facility] from s/p fall with left hip pain. Hx of dementia. ) FINDINGS: BRAIN/VENTRICLES: Motion degradation. There is minimal extra-axial hyperdensity identified along the right medial temporal lobe along the the lateral margin of the anterior leaflet worrisome for minimal subarachnoid blood products. There is no acute intracranial hemorrhage, mass effect or midline shift. No abnormal extra-axial fluid collection. The gray-white differentiation is maintained without evidence of an acute infarct. There is no evidence of hydrocephalus. Mild involutional change and chronic microvascular disease. Vascular calcifications. ORBITS: The visualized portion of the orbits demonstrate no acute abnormality. SINUSES: Right mastoid effusion. No coalescence identified. Mild ethmoid sinus mucosal thickening. SOFT TISSUES/SKULL:  No acute abnormality of the visualized skull or soft tissues. CT cervical spine demonstrates no evidence acute fracture traumatic malalignment. Vertebral heights are maintained. Moderate multilevel degenerate changes most advanced C5 through C7. Moderate severe multilevel degenerate facet arthropathy. No prevertebral soft tissue swelling. Question minimal right medial temporal lobe subarachnoid hemorrhage. Consider short-term follow-up head CT. Multilevel degenerate changes Cervical spine without acute fracture traumatic malalignment. Critical results were called by Dr. Garcia Centers to Dr Favian Rivera on 1/24/2022 at 23:23.      CT Cervical Spine WO Contrast    Result Date: 1/24/2022  EXAMINATION: CT OF THE HEAD WITHOUT CONTRAST; CT OF THE CERVICAL SPINE WITHOUT CONTRAST 1/24/2022 10:28 pm TECHNIQUE: CT of the head was performed without the administration of intravenous contrast. Dose modulation, iterative reconstruction, and/or weight based adjustment of the mA/kV was utilized to reduce the radiation dose to as low as reasonably achievable.; CT of the cervical spine was performed without the administration of intravenous contrast. Multiplanar reformatted images are provided for review. Dose modulation, iterative reconstruction, and/or weight based adjustment of the mA/kV was utilized to reduce the radiation dose to as low as reasonably achievable. COMPARISON: None. HISTORY: ORDERING SYSTEM PROVIDED HISTORY: Fall TECHNOLOGIST PROVIDED HISTORY: Reason for exam:->Fall Has a \"code stroke\" or \"stroke alert\" been called? ->No Decision Support Exception - unselect if not a suspected or confirmed emergency medical condition->Emergency Medical Condition (MA) Reason for Exam: fall Relevant Medical/Surgical History: Fall (Came in from [facility] from [facility] from s/p fall with left hip pain. Hx of dementia. ) FINDINGS: BRAIN/VENTRICLES: Motion degradation. There is minimal extra-axial hyperdensity identified along the right medial temporal lobe along the the lateral margin of the anterior leaflet worrisome for minimal subarachnoid blood products. There is no acute intracranial hemorrhage, mass effect or midline shift. No abnormal extra-axial fluid collection. The gray-white differentiation is maintained without evidence of an acute infarct. There is no evidence of hydrocephalus. Mild involutional change and chronic microvascular disease. Vascular calcifications. ORBITS: The visualized portion of the orbits demonstrate no acute abnormality. SINUSES: Right mastoid effusion. No coalescence identified. Mild ethmoid sinus mucosal thickening.  SOFT TISSUES/SKULL:  No acute abnormality of the visualized skull or soft tissues. CT cervical spine demonstrates no evidence acute fracture traumatic malalignment. Vertebral heights are maintained. Moderate multilevel degenerate changes most advanced C5 through C7. Moderate severe multilevel degenerate facet arthropathy. No prevertebral soft tissue swelling. Question minimal right medial temporal lobe subarachnoid hemorrhage. Consider short-term follow-up head CT. Multilevel degenerate changes Cervical spine without acute fracture traumatic malalignment. Critical results were called by Dr. Hugo Zhou to Dr Sharron Mina on 1/24/2022 at 23:23. XR CHEST PORTABLE    Result Date: 1/24/2022  EXAMINATION: ONE XRAY VIEW OF THE CHEST 1/24/2022 7:07 pm COMPARISON: 06/03/2017 HISTORY: ORDERING SYSTEM PROVIDED HISTORY: Suspect hip fracture, postop probability TECHNOLOGIST PROVIDED HISTORY: Reason for exam:->Suspect hip fracture, postop probability FINDINGS: The cardiac silhouette, mediastinal and hilar contours are normal.  The lungs are clear. There are no pleural effusions. No acute osseous abnormalities are identified. No acute cardiopulmonary disease. CT HIP LEFT WO CONTRAST    Result Date: 1/25/2022  EXAMINATION: CT OF THE LEFT HIP WITHOUT CONTRAST 1/24/2022 10:28 pm TECHNIQUE: CT of the left hip was performed without the administration of intravenous contrast.  Multiplanar reformatted images are provided for review. Dose modulation, iterative reconstruction, and/or weight based adjustment of the mA/kV was utilized to reduce the radiation dose to as low as reasonably achievable. COMPARISON: None. HISTORY ORDERING SYSTEM PROVIDED HISTORY: History fall 5 PM this evening, x-ray shows fracture TECHNOLOGIST PROVIDED HISTORY: Please evaluate pelvis with the CT scan.  Reason for exam:->History fall 5 PM this evening, x-ray shows fracture Decision Support Exception - unselect if not a suspected or confirmed emergency medical condition->Emergency Medical Condition (MA) Reason for Exam: vitals as above: alert, appears stated age and cooperative    Psychiatric: normal insight and judgment, oriented to person, place, time, and general circumstances    Head: Normocephalic, without obvious abnormality, atraumatic    Eyes:lids and lashes normal, conjunctivae and sclerae normal and pupils equal, round, reactive to light and accomodation    EMNT: external ears normal, nares midline    Neck: no carotid bruit, supple, symmetrical, trachea midline and thyroid not enlarged, symmetric, no tenderness/mass/nodules     Respiratory: clear to auscultation and percussion bilaterally with normal respiratory effort    Cardiovascular: normal rate, regular rhythm, normal S1 and S2 and no murmurs    Gastrointestinal: soft, non-tender, non-distended, normal bowel sounds, no masses or organomegaly    Extremities: dressing CDI  Skin:No rashes or nodules noted. Neurologic:negative             Dis[facility]: [facility]    Condition: stable    Discharge Medications:  [unfilled]       Medication List      START taking these medications     traMADol 50 MG tablet; Commonly known as: ULTRAM; Take 1 tablet by mouth   every 8 hours as needed for Pain for up to 3 days. CHANGE how you take these medications     QUEtiapine 25 MG tablet; Commonly known as: SEROquel; Take 1 tablet by   mouth nightly; What changed: how much to take     CONTINUE taking these medications     allopurinol 100 MG tablet; Commonly known as: ZYLOPRIM; TAKE ONE TABLET   BY MOUTH TWICE DAILY   ASPIRIN 81 PO   cloNIDine 0.2 MG tablet; Commonly known as: CATAPRES; TAKE 1 TABLET   TWICE A DAY   Diovan -25 MG per tablet; Generic drug:   valsartan-hydroCHLOROthiazide; TAKE 1 TABLET DAILY   donepezil 5 MG tablet; Commonly known as: ARICEPT   EpiCeram Emul;  Apply externally once daily   melatonin 3 MG Tabs tablet   omeprazole 20 MG delayed release capsule; Commonly known as: PRILOSEC;   TAKE ONE CAPSULE BY MOUTH DAILY   sucralfate 1 GM tablet; Commonly known as: CARAFATE; TAKE ONE TABLET BY   MOUTH FOUR TIMES A DAY   Toprol XL 25 MG extended release tablet; Generic drug: metoprolol   succinate; TAKE 1 TABLET DAILY         Allergies: Allergies   Allergen Reactions    Actonel [Risedronate Sodium] Other (See Comments)     Gastrointestinal upset.  Alendronate Sodium Other (See Comments)     Gastrointestinal upset    Climara [Estradiol]     Doxycycline Calcium [Doxycycline Calcium]     Estrogenic Substance     Mobic [Meloxicam] Other (See Comments)     Fluid.  Pcn [Penicillins]     Sulfa Antibiotics     Dyazide [Hydrochlorothiazide W-Triamterene] Rash    Vioxx [Rofecoxib] Rash and Other (See Comments)     Dermatitis. Follow up Instructions: Follow-up with PCP: Marylene Hartigan, MD in 2 wk . Total time spent on day of discharge including face-to-face visit, examination, documentation, counseling, preparation of discharge plans and followup, and discharge medicine reconciliation and presciptions is 33 minutes      ---       Subjective from day of d/c:   Jurgen Hoang is a 80 y.o. female. Pt seen and examined  Chart reviewed since last visit, labs and imaging below      Doing ok posto p  More alert  Needed transfusion yest  Family now interested in returning to snf with chris    Daughter present  All questions answered    She denies chest pain, denies shortness of breath, denies nausea,  denies emesis. 10 system Review of Systems is reviewed with patient, and pertinent positives are noted in HPI above . Otherwise, Review of systems is negative. I have reviewed the patient's medical and social history in detail and updated the computerized patient record.   To recap: She  has a past medical history of Disorder of bone and cartilage, unspecified, Esophagitis, unspecified, Gout, unspecified, Hx of colonic polyps, Hyperthyroidism, Memory loss, Osteoarthrosis, unspecified whether generalized or localized, unspecified site, Pedal edema, and Unspecified essential hypertension. . She  has a past surgical history that includes angioplasty (Bilateral) and Hip fracture surgery (Left, 1/25/2022). . She  reports that she has never smoked. She has never used smokeless tobacco. She reports that she does not drink alcohol and does not use drugs. .      Current Facility-Administered Medications: LORazepam (ATIVAN) injection 1 mg, 1 mg, IntraVENous, Q6H PRN  0.9 % sodium chloride infusion, , IntraVENous, PRN  pantoprazole (PROTONIX) tablet 40 mg, 40 mg, Oral, QAM AC  cloNIDine (CATAPRES) tablet 0.1 mg, 0.1 mg, Oral, BID  metoprolol succinate (TOPROL XL) extended release tablet 25 mg, 25 mg, Oral, Daily  allopurinol (ZYLOPRIM) tablet 100 mg, 100 mg, Oral, BID  QUEtiapine (SEROQUEL) tablet 50 mg, 50 mg, Oral, Nightly  donepezil (ARICEPT) tablet 5 mg, 5 mg, Oral, Nightly  melatonin tablet 3 mg, 3 mg, Oral, Nightly PRN  sucralfate (CARAFATE) tablet 1 g, 1 g, Oral, 4 times per day  0.9 % sodium chloride infusion, , IntraVENous, Continuous  sodium chloride flush 0.9 % injection 5-40 mL, 5-40 mL, IntraVENous, 2 times per day  sodium chloride flush 0.9 % injection 10 mL, 10 mL, IntraVENous, PRN  0.9 % sodium chloride infusion, 25 mL, IntraVENous, PRN  morphine (PF) injection 2 mg, 2 mg, IntraVENous, Q2H PRN **OR** morphine injection 4 mg, 4 mg, IntraVENous, Q2H PRN  ondansetron (ZOFRAN-ODT) disintegrating tablet 4 mg, 4 mg, Oral, Q8H PRN **OR** ondansetron (ZOFRAN) injection 4 mg, 4 mg, IntraVENous, Q6H PRN  magnesium hydroxide (MILK OF MAGNESIA) 400 MG/5ML suspension 30 mL, 30 mL, Oral, Daily PRN  potassium chloride (KLOR-CON M) extended release tablet 40 mEq, 40 mEq, Oral, PRN **OR** potassium bicarb-citric acid (EFFER-K) effervescent tablet 40 mEq, 40 mEq, Oral, PRN **OR** potassium chloride 10 mEq/100 mL IVPB (Peripheral Line), 10 mEq, IntraVENous, PRN  cefTRIAXone (ROCEPHIN) 1000 mg in sterile water 10 mL IV syringe, 1,000 mg, IntraVENous, Q24H  hydrALAZINE (APRESOLINE) injection 10 mg, 10 mg, IntraVENous, Q6H PRN  0.9 % sodium chloride bolus, 500 mL, IntraVENous, PRN  valsartan (DIOVAN) tablet 160 mg, 160 mg, Oral, Daily **AND** hydroCHLOROthiazide (HYDRODIURIL) tablet 25 mg, 25 mg, Oral, Daily  0.9 % sodium chloride infusion, , IntraVENous, Continuous  sodium chloride flush 0.9 % injection 5-40 mL, 5-40 mL, IntraVENous, 2 times per day  sodium chloride flush 0.9 % injection 5-40 mL, 5-40 mL, IntraVENous, PRN  0.9 % sodium chloride infusion, 25 mL, IntraVENous, PRN  polyethylene glycol (GLYCOLAX) packet 17 g, 17 g, Oral, Daily PRN  [Held by provider] enoxaparin (LOVENOX) injection 30 mg, 30 mg, SubCUTAneous, Daily  famotidine (PEPCID) tablet 20 mg, 20 mg, Oral, Daily  0.9 % sodium chloride infusion, , IntraVENous, PRN  traMADol (ULTRAM) tablet 50 mg, 50 mg, Oral, Q8H PRN  acetaminophen (TYLENOL) tablet 650 mg, 650 mg, Oral, TID         Objective (exam): see above    Labs at time of d/c:  Lab Results   Component Value Date    WBC 6.7 01/26/2022    HGB 6.2 (LL) 01/26/2022    HCT 18.7 (LL) 01/26/2022    PLT 74 (L) 01/26/2022    CHOL 195 11/26/2016    TRIG 149 11/26/2016    HDL 57 11/26/2016    ALT 13 01/25/2022    AST 20 01/25/2022     01/25/2022    K 4.8 01/25/2022     01/25/2022    CREATININE 1.2 01/25/2022    BUN 33 (H) 01/25/2022    CO2 24 01/25/2022    TSH 2.15 11/26/2016    INR 0.97 01/24/2022    LABMICR YES 01/24/2022     Lab Results   Component Value Date    TROPONINI <0.01 01/24/2022       (Please note that portions of this note were completed with a voice recognition program.  Efforts were made to edit the dictations but occasionally words are mis-transcribed.)      Signed:  Sukhjinder Mijares MD  1/27/2022

## 2022-01-27 NOTE — PROGRESS NOTES
Physical Therapy  Facility/Department: [address] ORTHO/NEURO NURSING  Daily Treatment Note  NAME: Ruthe Kawasaki  : 1928  MRN: [id number]    Date of Service: 2022    Discharge Recommendations:Ivy Rush scored a 7/24 on the AM-PAC short mobility form. Current research shows that an AM-PAC score of 17 or less is typically not associated with a discharge to the patient's home setting. Based on the patient's AM-PAC score and their current functional mobility deficits, it is recommended that the patient have 3-5 sessions per week of Physical Therapy at d/c to increase the patient's independence. Please see assessment section for further patient specific details. If patient discharges prior to next session this note will serve as a discharge summary. Please see below for the latest assessment towards goals. PT Equipment Recommendations  Equipment Needed: No    Assessment   Body structures, Functions, Activity limitations: Decreased functional mobility ; Decreased ADL status; Decreased ROM; Decreased strength;Decreased endurance;Decreased balance; Increased pain;Decreased cognition;Decreased safe awareness  Assessment: Pt presents as below her baseline function and would benefit from skilled PT services to promote safe return to PLOF. Pt demonstrates significant difficulty accepting weight on LLE, and impaired cognition limiting her tolerance to activity. Prognosis: Fair;Guarded  Decision Making: Medium Complexity  PT Education: Goals;PT Role;Plan of Care  Patient Education: D/C recommendations--pt unable to verbalize understanding  REQUIRES PT FOLLOW UP: Yes  Activity Tolerance  Activity Tolerance: Patient limited by cognitive status; Patient limited by pain     Patient Diagnosis(es): The primary encounter diagnosis was Closed fracture of left hip, [facility]  Diagnoses of Acute cystitis with hematuria, Dementia with behavioral disturbance, unspecified dementia type ([facility] and Abnormal CT of the head were also pertinent to this visit. has a past medical history of Disorder of bone and cartilage, unspecified, Esophagitis, unspecified, Gout, unspecified, Hx of colonic polyps, Hyperthyroidism, Memory loss, Osteoarthrosis, unspecified whether generalized or localized, unspecified site, Pedal edema, and Unspecified essential hypertension. has a past surgical history that includes angioplasty (Bilateral) and Hip fracture surgery (Left, 1/25/2022). Restrictions  Restrictions/Precautions  Restrictions/Precautions: Weight Bearing,Fall Risk  Required Braces or Orthoses?: No  Lower Extremity Weight Bearing Restrictions  Left Lower Extremity Weight Bearing: Weight Bearing As Tolerated  Position Activity Restriction  Other position/activity restrictions: 80 y.o. female who presented to [facility] ER after she apparently fell at her nursing home, [facility]. She resides in a locked dementia unit. She ambulates without assistive devices at baseline per her daughter. She does have advanced dementia and is unable to provide much history herself. Describes pain in the left hip of moderate intensity and of aching nature since the fall yesterday which is relieved by rest and morphine. Denies new numbness/tingling. Imaging review of the left hip via plain films and a CT scan demonstrated: Impacted intertrochanteric hip fracture with subtrochanteric extension. Subjective   General  Chart Reviewed: Yes  Response To Previous Treatment: Patient unable to report, no changes reported from family or staff  Family / Caregiver Present: No  Subjective  Subjective: RN approving PT/OT eval. Pt cognition impaired, but reporting need to use restroom;  Mckeon in place  General Comment  Comments: Pt supine in bed upon arrival.  Pain Screening  Patient Currently in Pain: Yes (with movement)  Pain Assessment  Pain Assessment: Advanced Dementia  Paul-Baker Pain Rating: Hurts even more  Vital Training,Endurance Training,Gait Training,Stair training,Home Exercise [employer] Devices  Type of devices:  All fall risk precautions in place,Call light within reach,Bed alarm in place,Gait belt,Patient at risk for falls,Left in bed,Nurse notified,Sitter present  Restraints  Initially in place: No     Therapy Time   Individual Concurrent Group Co-treatment   Time In       1345   Time Out       1445   Minutes       60   Timed Code Treatment Minutes: Annamaria Solis, DPT, [id number]  Chata Pollard, PT

## 2022-01-27 NOTE — PROGRESS NOTES
Occupational Therapy  Facility/Department: [address] ORTHO/NEURO NURSING  Daily Treatment Note  NAME: Colton Perez  : 1928  MRN: [id number]    Date of Service: 2022    Discharge Recommendations: Colton Perez scored a 6/24 on the AM-PAC ADL Inpatient form. Current research shows that an AM-PAC score of 17 or less is typically not associated with a discharge to the patient's home setting. Based on the patient's AM-PAC score and their current ADL deficits, it is recommended that the patient have 3-5 sessions per week of Occupational Therapy at d/c to increase the patient's independence. Please see assessment section for further patient specific details. If patient discharges prior to next session this note will serve as a discharge summary. Please see below for the latest assessment towards goals. 3-5 sessions per week       Assessment   Performance deficits / Impairments: Decreased functional mobility ; Decreased endurance;Decreased ADL status; Decreased safe awareness;Decreased high-level IADLs;Decreased ROM; Decreased cognition;Decreased balance  Assessment: Pt presenting significantly below her baseline with the above deficits. Pain and cognition substantially impacting participation and carryover with all ADLs and mobility. Per  and daughter, family is considering hospice. Continued OT indicated in order to address the above deficits. Treatment Diagnosis: above deficits associated with Abnormal CT of the head and ORIF of  L trochanter. Prognosis: Fair  Decision Making: Medium Complexity  Exam: as above  Assistance / Modification: Lift equipment; Dependent  OT Education: OT Role;Plan of Care  Patient Education: Daughter educated on DC recommendations and POC. verbalized understanding. Extra time required for patient and family education. Addressed all daughter's questions and concerns regarding discharge.   Barriers to Learning: cognition  REQUIRES OT FOLLOW UP: Yes  Activity Tolerance  Activity Tolerance: Patient limited by pain; Patient Tolerated treatment well  Safety Devices  Safety Devices in place: Yes  Type of devices: Bed alarm in place; All fall risk precautions in place; Left in bed;Call light within reach;Gait belt;Patient at risk for falls;Nurse notified  Restraints  Initially in place: No         Patient Diagnosis(es): The primary encounter diagnosis was Closed fracture of left hip, initial encounter ([facility] Diagnoses of Acute cystitis with hematuria, Dementia with behavioral disturbance, unspecified dementia type ([facility] and Abnormal CT of the head were also pertinent to this visit. has a past medical history of Disorder of bone and cartilage, unspecified, Esophagitis, unspecified, Gout, unspecified, Hx of colonic polyps, Hyperthyroidism, Memory loss, Osteoarthrosis, unspecified whether generalized or localized, unspecified site, Pedal edema, and Unspecified essential hypertension. has a past surgical history that includes angioplasty (Bilateral) and Hip fracture surgery (Left, 1/25/2022). Restrictions  Restrictions/Precautions  Restrictions/Precautions: Weight Bearing,Fall Risk  Required Braces or Orthoses?: No  Lower Extremity Weight Bearing Restrictions  Left Lower Extremity Weight Bearing: Weight Bearing As Tolerated  Position Activity Restriction  Other position/activity restrictions: 80 y.o. female who presented to [facility] ER after she apparently fell at her nursing home, [facility]. She resides in a locked dementia unit. She ambulates without assistive devices at baseline per her daughter. She does have advanced dementia and is unable to provide much history herself. Describes pain in the left hip of moderate intensity and of aching nature since the fall yesterday which is relieved by rest and morphine. Denies new numbness/tingling.    Imaging review of the left hip via plain films and a CT scan demonstrated: Impacted intertrochanteric hip fracture with subtrochanteric extension. Subjective   General  Chart Reviewed: Yes  Patient assessed for rehabilitation services?: Yes  Diagnosis: Abnormal CT of the head; ORIF- INTERTROCHANTERIC FRACTURE OF LEFT HIP USING TROCHANTERIC FIXATION  Subjective  Subjective: Pt supine in bed. Confused with Alzheimers dementia baseline. Daugther present. Pt unable to rate pain d/t cognition. Orientation  Orientation  Overall Orientation Status: Impaired  Orientation Level: Disoriented X4  Objective    ADL  Grooming: Dependent/Total  UE Bathing: Moderate assistance (Pt seated EOB, required cues to initiate task. Pt bathed under arms, OT assisted with arms.)  UE Dressing: Moderate assistance (Pt seated EOB. Pt initiated taking off gown and unthreaded sleeves. OT set-up new gown. Pt initiated threading sleeves for new gown.)  Additional Comments: Extra time required during ADLs d/t limited pain tolerance and confusion           Bed mobility  Supine to Sit: 2 Person assistance;Maximum assistance  Sit to Supine: Dependent/Total;2 Person assistance  Scootin Person assistance;Maximal assistance  Transfers  Transfer Comments: Initiated sit to stands but was limited by pain. Pt not able to bear any weight through LLE. Cognition  Overall Cognitive Status: Exceptions  Arousal/Alertness: Appropriate responses to stimuli  Following Commands: Follows one step commands with increased time; Follows one step commands with repetition  Memory: Decreased recall of precautions;Decreased short term memory;Decreased recall of biographical Information;Decreased recall of recent events  Safety Judgement: Decreased awareness of need for safety  Insights: Not aware of deficits  Initiation: Requires cues for some  Sequencing: Requires cues for all  Cognition Comment: Pt without insight into her funcitonal deficits and unable to problem solve at this time. Requires max verbal and tactile cues for initiation with all tasks. Perception  Overall Perceptual Status: [facility]      Plan   Plan  Times per week: 5-7  Times per day: Daily  Current Treatment Recommendations: Functional Mobility Training,Patient/Caregiver Education & Training,Self-Care / ADL,Safety Education & Training,Balance Training,Endurance Training    AM-PAC Score        AM-PAC Inpatient Daily Activity Raw Score: 6 (01/27/22 1510)  AM-PAC Inpatient ADL T-Scale Score : 17.07 (01/27/22 1510)  ADL Inpatient CMS 0-100% Score: 100 (01/27/22 1510)  ADL Inpatient CMS G-Code Modifier : CN (01/27/22 1510)    Goals  Short term goals  Time Frame for Short term goals: Discharge  Short term goal 1: Toileting at [facility] Max A x2 - not addressed 1/27  Short term goal 2: Functional sit<>stand/ stand>pivot txfers Max A x2 - attempted but pt unable d/t pain 1/27       Therapy Time   Individual Concurrent Group Co-treatment   Time In       1341   Time Out       1448   Minutes       67     Total Treatment Minutes:  1310 Opal Nicole, S/OT    Myrna Jimenez, OT   I have reviewed and agree with the above treatment note.  Myrna Jimenez OTR/L [id number]

## 2022-02-23 PROBLEM — N39.0 UTI (URINARY TRACT INFECTION): Status: RESOLVED | Noted: 2022-01-24 | Resolved: 2022-02-23

## 2022-03-07 ENCOUNTER — TELEPHONE (OUTPATIENT)
Dept: ORTHOPEDIC SURGERY | Age: 87
End: 2022-03-07

## 2022-03-07 NOTE — TELEPHONE ENCOUNTER
Surgery and/or Procedure Scheduling     Contact Name: Xenia Dear Request: SX ON 1/25/22 ON HER LT HIP  Patient Contact Number: [phone]    PATIENT DAUGHTER LUIS WOULD LIKE TO KNOW IF HER MOTHER COULD GET A VIRTUAL VISIT AND ALSO HAVE HER MOTHER X RAYS DONE AT THE FACILITY BECAUSE HER MOTHER IS MISSING APPOINTMENT BECAUSE OF TRANSPORTATION REASONS. PLEASE CALL HER DAUGHTER BACK AT THE ABOVE NUMBER OR PLEASE LEAVE A VM. PLEASE ADVISE.

## 2022-03-07 NOTE — TELEPHONE ENCOUNTER
I called and left message for nhan. I told her that I did not think that patient can have an X-rays at the facility because they will not be able to make a CD for Dr Bogdan Harvey to see the views.   I also told her that we will check with Dr Bogdan Harvey tomorrow about the X-rays and the Virtual visit

## 2022-03-08 ENCOUNTER — TELEPHONE (OUTPATIENT)
Dept: ORTHOPEDIC SURGERY | Age: 87
End: 2022-03-08

## 2022-03-08 NOTE — TELEPHONE ENCOUNTER
General Question     Subject: Patient's daughter is wondering if her mother can do her post op with someone in , [facility] [id number]. Please advise.    Patient Daughter Raisa Travis  Contact Number: [phone]

## 2022-03-09 NOTE — TELEPHONE ENCOUNTER
I left a message to inform Raisa that Raoul Rdz is willing to see her at the [place] office. When Raisa calls back, please schedule the patient. Dr. Dorota Vaz is aware and fine that the patient sees Raoul Rdz.

## 2022-08-01 NOTE — TELEPHONE ENCOUNTER
Daughter advised MEDICATIONS  (STANDING):  aspirin enteric coated 81 milliGRAM(s) Oral daily  atorvastatin 10 milliGRAM(s) Oral at bedtime  budesonide  80 MICROgram(s)/formoterol 4.5 MICROgram(s) Inhaler 2 Puff(s) Inhalation two times a day  chlorhexidine 2% Cloths 1 Application(s) Topical <User Schedule>  dextrose 50% Injectable 50 milliLiter(s) IV Push every 15 minutes  gabapentin 300 milliGRAM(s) Oral every 8 hours  gabapentin 600 milliGRAM(s) Oral every 8 hours  heparin   Injectable 5000 Unit(s) SubCutaneous every 8 hours  levothyroxine 25 MICROGram(s) Oral daily  metoprolol tartrate 12.5 milliGRAM(s) Oral every 12 hours  pantoprazole    Tablet 40 milliGRAM(s) Oral before breakfast  senna 2 Tablet(s) Oral at bedtime  sodium chloride 0.9%. 1000 milliLiter(s) (10 mL/Hr) IV Continuous <Continuous>    MEDICATIONS  (PRN):  oxycodone    5 mG/acetaminophen 325 mG 1 Tablet(s) Oral every 4 hours PRN Moderate Pain (4 - 6)

## 2024-11-07 NOTE — ED PROVIDER NOTES
I independently saw performed a substantive portion of the visit (history, physical, and MDM) on Marce Kelly. All diagnostic, treatment, and disposition decisions were made by myself in conjunction with the advanced practice provider. I have participated in the medical decision making and directed the treatment plan and disposition of the patient. For further details of [facility] emergency department encounter, please see the advanced practice provider's documentation. CHIEF COMPLAINT  Chief Complaint   Patient presents with   [address] in from [facility] from s/p fall with left hip pain. Hx of dementia. Briefly, Marce Kelly is a 80 y.o. female  who presents to the ED complaining of fall with left hip pain. Patient has severe dementia and so history is somewhat limited from her. No other apparent injuries noted acutely. This happened at her [facility] care at around 5:00 in the evening. She is here with her daughter who helps supplement the history and is POA. FOCUSED PHYSICAL EXAMINATION  BP (!) 140/70   Pulse 109   Temp 98.2 °F (36.8 °C) (Axillary)   Resp 15   Ht 4' 10\" (1.473 m)   Wt 110 lb (49.9 kg)   SpO2 100%   BMI 22.99 kg/m²    Focused physical examination notable for stable pelvis with nontender abdomen but left hip notably tender to palpation. Mildly tachycardic but regular rhythm. Clear to auscultation bilaterally. No cervical thoracic or lumbar spine tenderness on my exam.  She is demented and agitated and not providing much helpful history with her confusion and disorientation. No tenderness in the extremities other than the left hip.       The 12 lead EKG was interpreted by me as follows:  Rate: tachycardia with a rate of 118  Rhythm: sinus with fusion complex  Axis: normal  Intervals: normal UT, narrow QRS, normal QTc  ST segments: no ST elevations or depressions  T waves: no abnormal inversions  Non-specific T wave changes: previous_has_had_botox present  Prior EKG comparison: EKG dated 6/3/17 is not significantly different    MDM:  Diagnostic considerations included intracranial injury, cervical spine injury, chest/abdominal organ injury, extremity injury, abrasion/laceration, contusion, fracture, sprain/strain, dislocation    ED course was notable for fall with closed fracture of the left hip. No other apparent injuries noted. Rocephin for equivocal UTI with hematuria. She is demented with behavioral disturbance and did require some chemical sedation in order to complete her work-up in addition to analgesia. She will be admitted to the hospital for further evaluation and treatment. HCT showed a \"questionable minimal\" subarachnoid hemorrhage. Discussed with the POA daughter at the bedside regarding this incidental finding which could certainly be artifactual as the patient has an Alzheimer dementia baseline and is not anticoagulated. Nonetheless, even if it was real, daughter states that the patient is DNR CCA and would not want to pursue any neurosurgical interventions however does still want to pursue getting the hip fixed as she was ambulatory prior to the fall. As such I do feel it is reasonable to keep the patient at [facility] and did discuss this again with Dr. Michael Pereira the admitting physician who was in agreement with that plan. Dr. Mery Rocha from neurosurgery [facility]) was consulted about the patient's ED history, physical, workup, and course so far. Recommendations from this consultant included repeat HCT in 6 hours, even if very small would be \"clinically significant\" if unchanged on repeat scan.     During the patient's ED course, the patient was given:  Medications   0.9 % sodium chloride infusion (1,000 mLs IntraVENous New Bag 1/24/22 1938)   LORazepam (ATIVAN) tablet 1 mg (1 mg Oral Given 1/24/22 1846)   morphine injection 4 mg (4 mg IntraVENous Given 1/24/22 1935)   LORazepam (ATIVAN) injection 1 mg (1 mg IntraVENous Given 1/24/22 2151)   fentaNYL (SUBLIMAZE) injection 50 mcg (50 mcg IntraVENous Given 1/24/22 2150)   cefTRIAXone (ROCEPHIN) 1000 mg in sterile water 10 mL IV syringe (1,000 mg IntraVENous Given 1/24/22 2213)        CLINICAL IMPRESSION  1. Closed fracture of left hip, initial encounter ([facility]    2. Acute cystitis with hematuria    3. Dementia with behavioral disturbance, unspecified dementia type ([facility]    4. Abnormal CT of the head        DISPOSITION  Geehta Arshad was admitted in fair condition. The plan is to admit to the hospital at this time under the nursing home / SNF admitting service. Dr. Ketty Hernandez accepted the patient and will take over the patient's care. This chart was created using Dragon dictation software. Efforts were made by me to ensure accuracy, however some errors may be present due to limitations of this technology.             Silvina Sterling MD  01/24/22 2255       Silvina Sterling MD  01/25/22 0001

## (undated) DEVICE — C-ARM: Brand: UNBRANDED

## (undated) DEVICE — 6619 2 PTNT ISO SYS INCISE AREA&LT;(&GT;&&LT;)&GT;P: Brand: STERI-DRAPE™ IOBAN™ 2

## (undated) DEVICE — GUIDEWIRE ORTH L400MM DIA3.2MM FOR TFN

## (undated) DEVICE — SUTURE VCRL + SZ 1 L36IN ABSRB UD L36MM CT-1 1/2 CIR VCP947H

## (undated) DEVICE — BIT DRL L330MM DIA4.2MM CALIB 100MM 3 FLUT QUIK CPL

## (undated) DEVICE — MERCY FAIRFIELD TURNOVER KIT: Brand: MEDLINE INDUSTRIES, INC.

## (undated) DEVICE — MAT FLR ABS DISP

## (undated) DEVICE — SUTURE VCRL + SZ 2-0 L18IN ABSRB UD CT1 L36MM 1/2 CIR VCP839D

## (undated) DEVICE — GAUZE,SPONGE,4"X4",8PLY,STRL,LF,10/TRAY: Brand: MEDLINE

## (undated) DEVICE — COVER LT HNDL BLU PLAS

## (undated) DEVICE — STERILE VELCLOSE ELASTIC BANDAGE, 6IN: Brand: VELCLOSE

## (undated) DEVICE — Z DISCONTINUED USE 2272117 DRAPE SURG 3 QTR N INVASIVE 2 LAYR DISP

## (undated) DEVICE — INVIEW CLEAR LEGGINGS: Brand: CONVERTORS

## (undated) DEVICE — 3M™ TEGADERM™ TRANSPARENT FILM DRESSING FRAME STYLE, 1628, 6 IN X 8 IN (15 CM X 20 CM), 10/CT 8CT/CASE: Brand: 3M™ TEGADERM™

## (undated) DEVICE — SUTURE VCRL + SZ 0 L27IN ABSRB UD CT-1 L36MM 1/2 CIR TAPR VCP260H

## (undated) DEVICE — ROD RM 3X950 MM W/ BALL TIP SS STRL

## (undated) DEVICE — COTTON UNDERCAST PADDING,CRIMPED FINISH: Brand: WEBRIL

## (undated) DEVICE — APPLICATOR MEDICATED 26 CC SOLUTION HI LT ORNG CHLORAPREP

## (undated) DEVICE — BIT DRL L500MM DIA6X9MM CANN STP L QUIK CPL FOR DH DC TFN

## (undated) DEVICE — BANDAGE COBAN 6 IN WND 6INX5YD FOAM

## (undated) DEVICE — GLOVE SURG SZ 85 L12IN THK104MIL CRM LTX SMOOTH PWD ST

## (undated) DEVICE — STAPLER SKIN H3.9MM WIRE DIA0.58MM CRWN 6.9MM 35 STPL ROT

## (undated) DEVICE — MAJOR SET UP PK

## (undated) DEVICE — SPONGE LAP W18XL18IN WHT COT 4 PLY FLD STRUNG RADPQ DISP ST

## (undated) DEVICE — LIMB HOLDER, WRIST/ANKLE: Brand: DEROYAL

## (undated) DEVICE — PAD,ABDOMINAL,8"X10",ST,LF: Brand: MEDLINE

## (undated) DEVICE — SYRINGE MED 10ML TRNSLUC BRL PLUNG BLK MRK POLYPR CTRL

## (undated) DEVICE — DRESSING,GAUZE,XEROFORM,CURAD,5"X9",ST: Brand: CURAD

## (undated) DEVICE — STERILE POLYISOPRENE POWDER-FREE SURGICAL GLOVES: Brand: PROTEXIS